# Patient Record
Sex: FEMALE | Race: WHITE | NOT HISPANIC OR LATINO | Employment: OTHER | URBAN - METROPOLITAN AREA
[De-identification: names, ages, dates, MRNs, and addresses within clinical notes are randomized per-mention and may not be internally consistent; named-entity substitution may affect disease eponyms.]

---

## 2017-01-19 ENCOUNTER — TRANSFERRED RECORDS (OUTPATIENT)
Dept: HEALTH INFORMATION MANAGEMENT | Facility: CLINIC | Age: 82
End: 2017-01-19

## 2018-09-25 ENCOUNTER — TRANSFERRED RECORDS (OUTPATIENT)
Dept: HEALTH INFORMATION MANAGEMENT | Facility: CLINIC | Age: 83
End: 2018-09-25

## 2018-09-25 LAB — TSH SERPL-ACNC: 10.73 MIU/L (ref 0.4–4.5)

## 2018-10-01 ENCOUNTER — HOSPITAL ENCOUNTER (OUTPATIENT)
Dept: ULTRASOUND IMAGING | Facility: CLINIC | Age: 83
Discharge: HOME | End: 2018-10-01
Attending: FAMILY MEDICINE | Admitting: FAMILY MEDICINE
Payer: COMMERCIAL

## 2018-10-01 ENCOUNTER — RADIANT APPOINTMENT (OUTPATIENT)
Dept: GENERAL RADIOLOGY | Facility: CLINIC | Age: 83
End: 2018-10-01
Attending: FAMILY MEDICINE
Payer: COMMERCIAL

## 2018-10-01 ENCOUNTER — OFFICE VISIT (OUTPATIENT)
Dept: FAMILY MEDICINE | Facility: CLINIC | Age: 83
End: 2018-10-01
Payer: COMMERCIAL

## 2018-10-01 VITALS
SYSTOLIC BLOOD PRESSURE: 166 MMHG | OXYGEN SATURATION: 96 % | DIASTOLIC BLOOD PRESSURE: 102 MMHG | TEMPERATURE: 97 F | HEART RATE: 68 BPM | WEIGHT: 197 LBS

## 2018-10-01 DIAGNOSIS — M25.562 ACUTE PAIN OF LEFT KNEE: ICD-10-CM

## 2018-10-01 DIAGNOSIS — M79.89 LEFT LEG SWELLING: ICD-10-CM

## 2018-10-01 DIAGNOSIS — Z28.20 VACCINE REFUSED BY PATIENT: ICD-10-CM

## 2018-10-01 DIAGNOSIS — M25.462 KNEE EFFUSION, LEFT: ICD-10-CM

## 2018-10-01 DIAGNOSIS — I10 UNCONTROLLED HYPERTENSION: Primary | ICD-10-CM

## 2018-10-01 PROBLEM — Z91.199 PROBLEM WITH MEDICAL CARE COMPLIANCE: Status: ACTIVE | Noted: 2018-10-01

## 2018-10-01 LAB
ALBUMIN UR-MCNC: NEGATIVE MG/DL
APPEARANCE UR: CLEAR
BILIRUB UR QL STRIP: NEGATIVE
COLOR UR AUTO: YELLOW
GLUCOSE UR STRIP-MCNC: NEGATIVE MG/DL
HGB UR QL STRIP: ABNORMAL
KETONES UR STRIP-MCNC: NEGATIVE MG/DL
LEUKOCYTE ESTERASE UR QL STRIP: NEGATIVE
NITRATE UR QL: NEGATIVE
NON-SQ EPI CELLS #/AREA URNS LPF: ABNORMAL /LPF
PH UR STRIP: 6 PH (ref 5–7)
RBC #/AREA URNS AUTO: ABNORMAL /HPF
SOURCE: ABNORMAL
SP GR UR STRIP: <=1.005 (ref 1–1.03)
UROBILINOGEN UR STRIP-ACNC: 0.2 EU/DL (ref 0.2–1)
WBC #/AREA URNS AUTO: ABNORMAL /HPF

## 2018-10-01 PROCEDURE — 73560 X-RAY EXAM OF KNEE 1 OR 2: CPT | Mod: LT

## 2018-10-01 PROCEDURE — 71046 X-RAY EXAM CHEST 2 VIEWS: CPT | Mod: FY

## 2018-10-01 PROCEDURE — 93000 ELECTROCARDIOGRAM COMPLETE: CPT | Performed by: FAMILY MEDICINE

## 2018-10-01 PROCEDURE — 93971 EXTREMITY STUDY: CPT | Mod: LT

## 2018-10-01 PROCEDURE — 99204 OFFICE O/P NEW MOD 45 MIN: CPT | Performed by: FAMILY MEDICINE

## 2018-10-01 PROCEDURE — 81001 URINALYSIS AUTO W/SCOPE: CPT | Performed by: FAMILY MEDICINE

## 2018-10-01 RX ORDER — ACETAMINOPHEN 325 MG/1
325-650 TABLET ORAL EVERY 6 HOURS PRN
Qty: 100 TABLET | Refills: 0
Start: 2018-10-01 | End: 2021-10-07

## 2018-10-01 RX ORDER — LISINOPRIL 5 MG/1
5 TABLET ORAL DAILY
Qty: 30 TABLET | Refills: 0 | Status: SHIPPED | OUTPATIENT
Start: 2018-10-01 | End: 2018-10-24

## 2018-10-01 NOTE — NURSING NOTE
Initial BP (!) 166/102 (BP Location: Right arm, Patient Position: Chair, Cuff Size: Adult Large)  Pulse 68  Temp 97  F (36.1  C) (Tympanic)  Wt 197 lb (89.4 kg)  SpO2 96% There is no height or weight on file to calculate BMI. .    Diana Ojeda

## 2018-10-01 NOTE — PATIENT INSTRUCTIONS
You will be contacted in 1-2 business days to get a schedule for the orthopedic specialist for the left knee.  Acetaminophen 325 mg orally 1-2 tablets every 4-6 hrs as needed for pain    Your blood pressure today is uncontrolled.  Due to this medical treatment should be started to prevent complications.  Start lisinopril 5 mg orally once a day.  Blood, urine, chest xray and EKG tests are ordered as baseline.  Low salt, low fat diet.   Exercise as tolerated 30 mins per day 3 days a week.    Take meds as prescribed; call if with side effects.   Follow up in clinic in 1 week with the nurse.    Low-Salt Diet  This diet removes foods that are high in salt. It also limits the amount of salt you use when cooking. It is most often used for people with high blood pressure, edema (fluid retention), and kidney, liver, or heart disease.  Table salt contains the mineral sodium. Your body needs sodium to work normally. But too much sodium can make your health problems worse. Your healthcare provider is recommending a low-salt (also called low-sodium) diet for you. Your total daily allowance of salt is 1,500 to 2,300 milligrams (mg). It is less than 1 teaspoon of table salt. This means you can have only about 500 to 700 mg of sodium at each meal. People with certain health problems should limit salt intake to the lower end of the recommended range.    When you cook, don t add much salt. If you can cook without using salt, even better. Don t add salt to your food at the table.  When shopping, read food labels. Salt is often called sodium on the label. Choose foods that are salt-free, low salt, or very low salt. Note that foods with reduced salt may not lower your salt intake enough.    Beans, potatoes, and pasta  Ok: Dry beans, split peas, lentils, potatoes, rice, macaroni, pasta, spaghetti without added salt  Avoid: Potato chips, tortilla chips, and similar products  Breads and cereals  Ok: Low-sodium breads, rolls, cereals, and  cakes; low-salt crackers, matzo crackers  Avoid: Salted crackers, pretzels, popcorn, Bulgarian toast, pancakes, muffins  Dairy  Ok: Milk, chocolate milk, hot chocolate mix, low-salt cheeses, and yogurt  Avoid: Processed cheese and cheese spreads; Roquefort, Camembert, and cottage cheese; buttermilk, instant breakfast drink  Desserts  Ok: Ice cream, frozen yogurt, juice bars, gelatin, cookies and pies, sugar, honey, jelly, hard candy  Avoid: Most pies, cakes and cookies prepared or processed with salt; instant pudding  Drinks  Ok: Tea, coffee, fizzy (carbonated) drinks, juices  Avoid: Flavored coffees, electrolyte replacement drinks, sports drinks  Meats  Ok: All fresh meat, fish, poultry, low-salt tuna, eggs, egg substitute  Avoid: Smoked, pickled, brine-cured, or salted meats and fish. This includes wright, chipped beef, corned beef, hot dogs, deli meats, ham, kosher meats, salt pork, sausage, canned tuna, salted codfish, smoked salmon, herring, sardines, or anchovies.  Seasonings and spices  Ok: Most seasonings are okay. Good substitutes for salt include: fresh herb blends, hot sauce, lemon, garlic, mtz, vinegar, dry mustard, parsley, cilantro, horseradish, tomato paste, regular margarine, mayonnaise, unsalted butter, cream cheese, vegetable oil, cream, low-salt salad dressing and gravy.  Avoid: Regular ketchup, relishes, pickles, soy sauce, teriyaki sauce, Worcestershire sauce, BBQ sauce, tartar sauce, meat tenderizer, chili sauce, regular gravy, regular salad dressing, salted butter  Soups  Ok: Low-salt soups and broths made with allowed foods  Avoid: Bouillon cubes, soups with smoked or salted meats, regular soup and broth  Vegetables  Ok: Most vegetables are okay; also low-salt tomato and vegetable juices  Avoid: Sauerkraut and other brine-soaked vegetables; pickles and other pickled vegetables; tomato juice, olives  Date Last Reviewed: 8/1/2016 2000-2017 [employer]. [address] [facility],  [place] [address]. All rights reserved. This information is not intended as a substitute for professional medical care. Always follow your healthcare professional's instructions.

## 2018-10-01 NOTE — PROGRESS NOTES
SUBJECTIVE:   Sena Saini is a 83 year old female who presents to clinic today for the following health issues:      New Patient/Transfer of Care- has had very little medical care in [place]    Hx of HTN has not taken medication for 2 years and quit taking. States her BP's are in the 170/101, 150/91, up to 196/110.  Patient has not been on medications due to using non-medical homeopathic treatments/supplements and infrared treatments.  Had not been better in spite of the non-medical treatments.  Denies chest pain, dyspnea, HA, BOV, dizziness or urinary changes.  Patient said she has not been observing sodium restriction.    C/O left leg swelling and pain for 6 days, concerned about having a blood clot.  Patient denies new activities.  Patient has had a road trip to [facility] about 3 weeks ago - said they stopped about three times for rest stops/voiding breaks during one way trips.    Patient refuses vaccines.      Problem list and histories reviewed & adjusted, as indicated.  Additional history: as documented    Patient Active Problem List   Diagnosis     Vaccine refused by patient     Problem with medical care compliance     Uncontrolled hypertension     Past Surgical History:   Procedure Laterality Date     APPENDECTOMY       COLONOSCOPY         Social History   Substance Use Topics     Smoking status: Former Smoker     Types: Cigarettes     Smokeless tobacco: Not on file     Alcohol use Not on file     Family History   Problem Relation Age of Onset     Hypertension Mother      Colon Cancer Brother      Other Cancer Brother          Current Outpatient Prescriptions   Medication Sig Dispense Refill     acetaminophen (TYLENOL) 325 MG tablet Take 1-2 tablets (325-650 mg) by mouth every 6 hours as needed for mild pain 100 tablet 0     lisinopril (PRINIVIL/ZESTRIL) 5 MG tablet Take 1 tablet (5 mg) by mouth daily 30 tablet 0     Allergies   Allergen Reactions     Penicillins        Reviewed and updated as needed  this visit by clinical staff  Allergies  Meds  Problems       Reviewed and updated as needed this visit by Provider  Allergies  Meds  Problems         ROS:  C: NEGATIVE for fever, chills, or change in weight  I: NEGATIVE for worrisome rashes, moles or lesions  E: NEGATIVE for vision changes or irritation  ENT: NEGATIVE for ear, mouth and throat problems  R: NEGATIVE for significant cough or SOB  CV: NEGATIVE for chest pain, palpitations or peripheral edema  GI: NEGATIVE for nausea, abdominal pain, heartburn, or change in bowel habits   male: negative for dysuria, hematuria, decreased urinary stream, erectile dysfunction, urethral discharge  M: see above  N: NEGATIVE for weakness, dizziness or paresthesias  E: NEGATIVE for temperature intolerance, skin/hair changes  H: NEGATIVE for bleeding problems  P: NEGATIVE for changes in mood or affect    OBJECTIVE:                                                    BP (!) 166/102 (BP Location: Right arm, Patient Position: Chair, Cuff Size: Adult Large)  Pulse 68  Temp 97  F (36.1  C) (Tympanic)  Wt 197 lb (89.4 kg)  SpO2 96%  There is no height or weight on file to calculate BMI.  GENERAL: healthy, alert and no distress, ambulatory w/o assist  NECK: no tenderness, no adenopathy,  Thyroid not enlarged  RESP: lungs clear to auscultation - no rales, no rhonchi, no wheezes  CV: regular rates and rhythm, no murmur  MS: right leg no edema or TTP; LLE with mild slightly pitting edema to the knee with mild popliteal and proximal calf TTP but no erythema or increased warmth; bilateral knees with full range of motion and mild crepitation with no pain on range of motion   SKIN: no suspicious lesions, no rashes  NEURO: no tremors  ABD:  nontender    Diagnostic test results:  Diagnostic Test Results:  none      ASSESSMENT/PLAN:                                                        ICD-10-CM    1. Uncontrolled hypertension I10 lisinopril (PRINIVIL/ZESTRIL) 5 MG tablet     EKG  12-lead complete w/read - Clinics     *UA reflex to Microscopic     XR Chest 2 Views     Urine Microscopic  Patient was advised BP uncontrolled today.  Reviewed meds with patient.  Start the above med to optimize management.  Reinforced sodium restriction.  Exercise recommendations reviewed with patient.  Recheck with RN in 1 week      2. Left leg swelling M79.89 US Lower Extremity Venous Duplex Left  With recent long distance driving, should r/o DVT.  Patient returned to clinic after US - advised no DVT on US.  No other red flag signs today.  Consider venous insufficiency, dependent edema or part of DJD?     3. Acute pain of left knee M25.562 US Lower Extremity Venous Duplex Left     XR Knee Standing AP Bilat & Lateral Left     ORTHO  REFERRAL     acetaminophen (TYLENOL) 325 MG tablet     DDx: DJD, inflammatory arthritis, crystalloid arthritis, strain, bakers cyst, DVT  DVT ruled out by US today.  Reviewed with patient xr images - some DJD present.  Consult ortho as US also showed nn-specific effusion. Consider tapping the effusion.  Acetaminophen 325 mg orally 1-2 tabs every 4-6 hrs as needed for pain     4. Knee effusion, left M25.462 ORTHO  REFERRAL  Unclear etiology.  DJD?   See above.     5. Vaccine refused by patient Z28.20      Total time: 40 mins, more than 50 % spent in direct patient care and addressing all the above    Follow up with RN 1 week  Ortho consult when scheduled in next 1-2 weeks.     Patient Instructions   You will be contacted in 1-2 business days to get a schedule for the orthopedic specialist for the left knee.  Acetaminophen 325 mg orally 1-2 tablets every 4-6 hrs as needed for pain    Your blood pressure today is uncontrolled.  Due to this medical treatment should be started to prevent complications.  Start lisinopril 5 mg orally once a day.  Blood, urine, chest xray and EKG tests are ordered as baseline.  Low salt, low fat diet.   Exercise as tolerated 30 mins per day  3 days a week.    Take meds as prescribed; call if with side effects.   Follow up in clinic in 1 week with the nurse.    Low-Salt Diet  This diet removes foods that are high in salt. It also limits the amount of salt you use when cooking. It is most often used for people with high blood pressure, edema (fluid retention), and kidney, liver, or heart disease.  Table salt contains the mineral sodium. Your body needs sodium to work normally. But too much sodium can make your health problems worse. Your healthcare provider is recommending a low-salt (also called low-sodium) diet for you. Your total daily allowance of salt is 1,500 to 2,300 milligrams (mg). It is less than 1 teaspoon of table salt. This means you can have only about 500 to 700 mg of sodium at each meal. People with certain health problems should limit salt intake to the lower end of the recommended range.    When you cook, don t add much salt. If you can cook without using salt, even better. Don t add salt to your food at the table.  When shopping, read food labels. Salt is often called sodium on the label. Choose foods that are salt-free, low salt, or very low salt. Note that foods with reduced salt may not lower your salt intake enough.    Beans, potatoes, and pasta  Ok: Dry beans, split peas, lentils, potatoes, rice, macaroni, pasta, spaghetti without added salt  Avoid: Potato chips, tortilla chips, and similar products  Breads and cereals  Ok: Low-sodium breads, rolls, cereals, and cakes; low-salt crackers, matzo crackers  Avoid: Salted crackers, pretzels, popcorn, Chinese toast, pancakes, muffins  Dairy  Ok: Milk, chocolate milk, hot chocolate mix, low-salt cheeses, and yogurt  Avoid: Processed cheese and cheese spreads; Roquefort, Camembert, and cottage cheese; buttermilk, instant breakfast drink  Desserts  Ok: Ice cream, frozen yogurt, juice bars, gelatin, cookies and pies, sugar, honey, jelly, hard candy  Avoid: Most pies, cakes and cookies  prepared or processed with salt; instant pudding  Drinks  Ok: Tea, coffee, fizzy (carbonated) drinks, juices  Avoid: Flavored coffees, electrolyte replacement drinks, sports drinks  Meats  Ok: All fresh meat, fish, poultry, low-salt tuna, eggs, egg substitute  Avoid: Smoked, pickled, brine-cured, or salted meats and fish. This includes wright, chipped beef, corned beef, hot dogs, deli meats, ham, kosher meats, salt pork, sausage, canned tuna, salted codfish, smoked salmon, herring, sardines, or anchovies.  Seasonings and spices  Ok: Most seasonings are okay. Good substitutes for salt include: fresh herb blends, hot sauce, lemon, garlic, mtz, vinegar, dry mustard, parsley, cilantro, horseradish, tomato paste, regular margarine, mayonnaise, unsalted butter, cream cheese, vegetable oil, cream, low-salt salad dressing and gravy.  Avoid: Regular ketchup, relishes, pickles, soy sauce, teriyaki sauce, Worcestershire sauce, BBQ sauce, tartar sauce, meat tenderizer, chili sauce, regular gravy, regular salad dressing, salted butter  Soups  Ok: Low-salt soups and broths made with allowed foods  Avoid: Bouillon cubes, soups with smoked or salted meats, regular soup and broth  Vegetables  Ok: Most vegetables are okay; also low-salt tomato and vegetable juices  Avoid: Sauerkraut and other brine-soaked vegetables; pickles and other pickled vegetables; tomato juice, olives  Date Last Reviewed: 8/1/2016 2000-2017 [email or web address]. [address] [place] [address], [place] [address]. All rights reserved. This information is not intended as a substitute for professional medical care. Always follow your healthcare professional's instructions.            Neno Genao MD  [facility]

## 2018-10-01 NOTE — PROGRESS NOTES
Patient was advised before leaving clinic today.  P-waves identified. No ST changes consistent with acute ischemia.  Treat hypertension.

## 2018-10-01 NOTE — MR AVS SNAPSHOT
After Visit Summary   10/1/2018    Sena Saini    MRN: [id number]           Patient Information     Date Of Birth          1935        Visit Information        Provider Department      10/1/2018 7:40 AM Neno Genao MD [facility]        Today's Diagnoses     Uncontrolled hypertension    -  1    Left leg swelling        Acute pain of left knee        Knee effusion, left        Vaccine refused by patient          Care Instructions    You will be contacted in 1-2 business days to get a schedule for the orthopedic specialist for the left knee.  Acetaminophen 325 mg orally 1-2 tablets every 4-6 hrs as needed for pain    Your blood pressure today is uncontrolled.  Due to this medical treatment should be started to prevent complications.  Start lisinopril 5 mg orally once a day.  Blood, urine, chest xray and EKG tests are ordered as baseline.  Low salt, low fat diet.   Exercise as tolerated 30 mins per day 3 days a week.    Take meds as prescribed; call if with side effects.   Follow up in clinic in 1 week with the nurse.    Low-Salt Diet  This diet removes foods that are high in salt. It also limits the amount of salt you use when cooking. It is most often used for people with high blood pressure, edema (fluid retention), and kidney, liver, or heart disease.  Table salt contains the mineral sodium. Your body needs sodium to work normally. But too much sodium can make your health problems worse. Your healthcare provider is recommending a low-salt (also called low-sodium) diet for you. Your total daily allowance of salt is 1,500 to 2,300 milligrams (mg). It is less than 1 teaspoon of table salt. This means you can have only about 500 to 700 mg of sodium at each meal. People with certain health problems should limit salt intake to the lower end of the recommended range.    When you cook, don t add much salt. If you can cook without using salt, even better. Don t add salt to  your food at the table.  When shopping, read food labels. Salt is often called sodium on the label. Choose foods that are salt-free, low salt, or very low salt. Note that foods with reduced salt may not lower your salt intake enough.    Beans, potatoes, and pasta  Ok: Dry beans, split peas, lentils, potatoes, rice, macaroni, pasta, spaghetti without added salt  Avoid: Potato chips, tortilla chips, and similar products  Breads and cereals  Ok: Low-sodium breads, rolls, cereals, and cakes; low-salt crackers, matzo crackers  Avoid: Salted crackers, pretzels, popcorn, [place] toast, pancakes, muffins  Dairy  Ok: Milk, chocolate milk, hot chocolate mix, low-salt cheeses, and yogurt  Avoid: Processed cheese and cheese spreads; Roquefort, Camembert, and cottage cheese; buttermilk, instant breakfast drink  Desserts  Ok: Ice cream, frozen yogurt, juice bars, gelatin, cookies and pies, sugar, honey, jelly, hard candy  Avoid: Most pies, cakes and cookies prepared or processed with salt; instant pudding  Drinks  Ok: Tea, coffee, fizzy (carbonated) drinks, juices  Avoid: Flavored coffees, electrolyte replacement drinks, sports drinks  Meats  Ok: All fresh meat, fish, poultry, low-salt tuna, eggs, egg substitute  Avoid: Smoked, pickled, brine-cured, or salted meats and fish. This includes wright, chipped beef, corned beef, hot dogs, deli meats, ham, kosher meats, salt pork, sausage, canned tuna, salted codfish, smoked salmon, herring, sardines, or anchovies.  Seasonings and spices  Ok: Most seasonings are okay. Good substitutes for salt include: fresh herb blends, hot sauce, lemon, garlic, mtz, vinegar, dry mustard, parsley, cilantro, horseradish, tomato paste, regular margarine, mayonnaise, unsalted butter, cream cheese, vegetable oil, cream, low-salt salad dressing and gravy.  Avoid: Regular ketchup, relishes, pickles, soy sauce, teriyaki sauce, Worcestershire sauce, BBQ sauce, tartar sauce, meat tenderizer, chili sauce,  regular gravy, regular salad dressing, salted butter  Soups  Ok: Low-salt soups and broths made with allowed foods  Avoid: Bouillon cubes, soups with smoked or salted meats, regular soup and broth  Vegetables  Ok: Most vegetables are okay; also low-salt tomato and vegetable juices  Avoid: Sauerkraut and other brine-soaked vegetables; pickles and other pickled vegetables; tomato juice, olives  Date Last Reviewed: 8/1/2016 2000-2017 [employer]. [address] [place] [address]. All rights reserved. This information is not intended as a substitute for professional medical care. Always follow your healthcare professional's instructions.                Follow-ups after your visit        Additional Services     ORTHO  REFERRAL       [facility] is referring you to the Orthopedic  Services at [facility].       The  Representative will assist you in the coordination of your Orthopedic and Musculoskeletal Care as prescribed by your physician.    The  Representative will call you within 1 business day to help schedule your appointment, or you may contact the  Representative at:    All areas ~ [phone]     Type of Referral : Non Surgical / Sport Medicine       Timeframe requested: 1 - 2 days    Coverage of these services is subject to the terms and limitations of your health insurance plan.  Please call member services at your health plan with any benefit or coverage questions.      If X-rays, CT or MRI's have been performed, please contact the facility where they were done to arrange for , prior to your scheduled appointment.  Please bring this referral request to your appointment and present it to your specialist.                  Follow-up notes from your care team     Return in about 1 week (around 10/8/2018), or if symptoms worsen or fail to improve, for BP Recheck with RN.      Your next 10  "appointments already scheduled     Oct 08, 2018  8:45 AM CDT   SHORT with [place] FP/IM RN   [facility]    [address] [address]   [phone]            Oct 08, 2018  9:30 AM CDT   SHORT with [place] FP/IM RN   [facility]    [address] [address]   [phone]              Who to contact     If you have questions or need follow up information about today's clinic visit or your schedule please contact [facility] directly at [phone].  Normal or non-critical lab and imaging results will be communicated to you by HowDohart, letter or phone within 4 business days after the clinic has received the results. If you do not hear from us within 7 days, please contact the clinic through HowDohart or phone. If you have a critical or abnormal lab result, we will notify you by phone as soon as possible.  Submit refill requests through MeriTaleem or call your pharmacy and they will forward the refill request to us. Please allow 3 business days for your refill to be completed.          Additional Information About Your Visit        HowDohart Information     MeriT[employer]em lets you send messages to your doctor, view your test results, renew your prescriptions, schedule appointments and more. To sign up, go to [email or web address] . Click on \"Log in\" on the left side of the screen, which will take you to the Welcome page. Then click on \"Sign up Now\" on the right side of the page.     You will be asked to enter the access code listed below, as well as some personal information. Please follow the directions to create your username and password.     Your access code is: [email or web address]  Expires: 2018  7:34 AM     Your access code will  in 90 days. If you need help or a new code, please call your [place] clinic or [phone].        Care EveryWhere ID     This is your Care EveryWhere ID. This " could be used by other organizations to access your [place] medical records  [id number]        Your Vitals Were     Pulse Temperature Pulse Oximetry             68 97  F (36.1  C) (Tympanic) 96%          Blood Pressure from Last 3 Encounters:   10/01/18 (!) 166/102    Weight from Last 3 Encounters:   10/01/18 197 lb (89.4 kg)              We Performed the Following     *UA reflex to Microscopic     EKG 12-lead complete w/read - Clinics     ORTHO  REFERRAL     US Lower Extremity Venous Duplex Left     XR Chest 2 Views     XR Knee Standing AP Bilat & Lateral Left          Today's Medication Changes          These changes are accurate as of 10/1/18 10:34 AM.  If you have any questions, ask your nurse or doctor.               Start taking these medicines.        Dose/Directions    acetaminophen 325 MG tablet   Commonly known as:  TYLENOL   Used for:  Acute pain of left knee   Started by:  Neno Genao MD        Dose:  325-650 mg   Take 1-2 tablets (325-650 mg) by mouth every 6 hours as needed for mild pain   Quantity:  100 tablet   Refills:  0       lisinopril 5 MG tablet   Commonly known as:  PRINIVIL/ZESTRIL   Used for:  Uncontrolled hypertension   Started by:  Neno Genao MD        Dose:  5 mg   Take 1 tablet (5 mg) by mouth daily   Quantity:  30 tablet   Refills:  0            Where to get your medicines      These medications were [id number] [place] - [address]  [address] [place] [address]     Phone:  [phone]     lisinopril 5 MG tablet         Some of these will need a paper prescription and others can be bought over the counter.  Ask your nurse if you have questions.     You don't need a prescription for these medications     acetaminophen 325 MG tablet                Primary Care Provider Office Phone # Fax #    Neno Genao [phone] [address] [address]         Equal Access to Services     [facility] SANDI : Hadii aad ku haddenaejeremy Owens, wareynoldda luqadaha, qaybta taraevelynviky robin. So [facility] [phone].    ATENCIÓN: Si habla español, tiene a alatorre disposición servicios gratuitos de asistencia lingüística. Llame al [phone].    We comply with applicable federal civil rights laws and [place] laws. We do not discriminate on the basis of race, color, national origin, age, disability, sex, sexual orientation, or gender identity.            Thank you!     Thank you for choosing [facility]  for your care. Our goal is always to provide you with excellent care. Hearing back from our patients is one way we can continue to improve our services. Please take a few minutes to complete the written survey that you may receive in the mail after your visit with us. Thank you!             Your Updated Medication List - Protect others around you: Learn how to safely use, store and throw away your medicines at www.disposemymeds.org.          This list is accurate as of 10/1/18 10:34 AM.  Always use your most recent med list.                   Brand Name Dispense Instructions for use Diagnosis    acetaminophen 325 MG tablet    TYLENOL    100 tablet    Take 1-2 tablets (325-650 mg) by mouth every 6 hours as needed for mild pain    Acute pain of left knee       lisinopril 5 MG tablet    PRINIVIL/ZESTRIL    30 tablet    Take 1 tablet (5 mg) by mouth daily    Uncontrolled hypertension

## 2018-10-03 ENCOUNTER — OFFICE VISIT (OUTPATIENT)
Dept: ORTHOPEDICS | Facility: CLINIC | Age: 83
End: 2018-10-03
Payer: COMMERCIAL

## 2018-10-03 VITALS
DIASTOLIC BLOOD PRESSURE: 92 MMHG | HEIGHT: 69 IN | RESPIRATION RATE: 16 BRPM | SYSTOLIC BLOOD PRESSURE: 154 MMHG | BODY MASS INDEX: 29.18 KG/M2 | WEIGHT: 197 LBS

## 2018-10-03 DIAGNOSIS — M65.332 TRIGGER MIDDLE FINGER OF LEFT HAND: ICD-10-CM

## 2018-10-03 DIAGNOSIS — M17.12 PRIMARY OSTEOARTHRITIS OF LEFT KNEE: Primary | ICD-10-CM

## 2018-10-03 PROCEDURE — 99203 OFFICE O/P NEW LOW 30 MIN: CPT | Performed by: PEDIATRICS

## 2018-10-03 ASSESSMENT — PAIN SCALES - GENERAL: PAINLEVEL: MILD PAIN (2)

## 2018-10-03 NOTE — MR AVS SNAPSHOT
"              After Visit Summary   10/3/2018    Sena Saini    MRN: [id number]           Patient Information     Date Of Birth          1935        Visit Information        Provider Department      10/3/2018 10:40 AM Aayush Figueroa,  [facility]        Care Instructions    Patient to follow up with Primary Care provider regarding elevated blood pressure.              Follow-ups after your visit        Your next 10 appointments already scheduled     Oct 08, 2018  8:45 AM CDT   SHORT with [place] FP/IM RN   [facility]    [address] [address]   [phone]            Oct 08, 2018  9:30 AM CDT   SHORT with [place] FP/IM RN   [facility]    [address] [address]   [phone]              Who to contact     If you have questions or need follow up information about today's clinic visit or your schedule please contact [facility] directly at [phone].  Normal or non-critical lab and imaging results will be communicated to you by EdSurgehart, letter or phone within 4 business days after the clinic has received the results. If you do not hear from us within 7 days, please contact the clinic through EdSurgehart or phone. If you have a critical or abnormal lab result, we will notify you by phone as soon as possible.  Submit refill requests through [email or web address] or call your pharmacy and they will forward the refill request to us. Please allow 3 business days for your refill to be completed.          Additional Information About Your Visit        MyChart Information     [email or web address] lets you send messages to your doctor, view your test results, renew your prescriptions, schedule appointments and more. To sign up, go to [email or web address] . Click on \"Log in\" on the left side of the screen, which will take you to the Welcome page. " "Then click on \"Sign up Now\" on the right side of the page.     You will be asked to enter the access code listed below, as well as some personal information. Please follow the directions to create your username and password.     Your access code is: [email or web address]  Expires: 2018  7:34 AM     Your access code will  in 90 days. If you need help or a new code, please call your [place] clinic or [phone].        Care EveryWhere ID     This is your Care EveryWhere ID. This could be used by other organizations to access your [place] medical records  [id number]        Your Vitals Were     Respirations Height BMI (Body Mass Index)             16 5' 9\" (1.753 m) 29.09 kg/m2          Blood Pressure from Last 3 Encounters:   10/03/18 (!) 154/92   10/01/18 (!) 166/102    Weight from Last 3 Encounters:   10/03/18 197 lb (89.4 kg)   10/01/18 197 lb (89.4 kg)              Today, you had the following     No orders found for display       Primary Care Provider Office Phone # Fax #    Neno Edwin Genao [phone] [address] [address]        Equal Access to Services     RACHEL JUNIOR : Hadii aad ku hadasho Soomaali, waaxda luqadaha, qaybta kaalmada adeegyada, waxay amber haykulwantn iker forde . So [facility] [phone].    ATENCIÓN: Si habla español, tiene a alatorre disposición servicios gratuitos de asistencia lingüística. Llame al [phone].    We comply with applicable federal civil rights laws and [place] laws. We do not discriminate on the basis of race, color, national origin, age, disability, sex, sexual orientation, or gender identity.            Thank you!     Thank you for choosing [facility]  for your care. Our goal is always to provide you with excellent care. Hearing back from our patients is one way we can continue to improve our services. Please take a few minutes to complete the written survey that you may " receive in the mail after your visit with us. Thank you!             Your Updated Medication List - Protect others around you: Learn how to safely use, store and throw away your medicines at www.disposemymeds.org.          This list is accurate as of 10/3/18 11:39 AM.  Always use your most recent med list.                   Brand Name Dispense Instructions for use Diagnosis    acetaminophen 325 MG tablet    TYLENOL    100 tablet    Take 1-2 tablets (325-650 mg) by mouth every 6 hours as needed for mild pain    Acute pain of left knee       lisinopril 5 MG tablet    PRINIVIL/ZESTRIL    30 tablet    Take 1 tablet (5 mg) by mouth daily    Uncontrolled hypertension

## 2018-10-03 NOTE — NURSING NOTE
"Initial BP (!) 154/92  Resp 16  Ht 5' 9\" (1.753 m)  Wt 197 lb (89.4 kg)  BMI 29.09 kg/m2 Estimated body mass index is 29.09 kg/(m^2) as calculated from the following:    Height as of this encounter: 5' 9\" (1.753 m).    Weight as of this encounter: 197 lb (89.4 kg). .      "

## 2018-10-03 NOTE — LETTER
10/3/2018         RE: Sena Saini  [address]  [place] [address]        Dear Colleague,    Thank you for referring your patient, Sena Saini, to the [facility]. Please see a copy of my visit note below.    Sports Medicine Clinic Visit    PCP: Neno Genao    Sena Saini is a 83 year old female who is seen  in consultation at the request of  Neno Genao M.D. presenting with left knee pain.    Injury: None. Noticed sharp pain in left knee after getting out of car during a trip.    **  Lateroposterior knee pain. Swelling noted on LLE. Reports difficulty getting out of the car, pain began after trying to get out of car. Reports using walker after knee injury. Has been using compression with some symptom alleviation.     Also with left hand discomfort, notes issues at her left long finger.  Trigger finger of long finger. States finger locks into placed while playing instrument. Reports worsening of symptoms at night. Aggravated while cooking and playing instrument.    Presents with     Location of Pain: left posterior knee  Duration of Pain: Ongoing, but worse 1 week ago.  Rating of Pain at worst: 10/10  Rating of Pain Currently: 2/10 While sitting   Symptoms are better with: Knee brace  Symptoms are worse with: Walking, moving to sit/stand, 2 seconds after first standing.   Additional Features:   Positive: Swelling of left leg, left posterior knee swelling,     Other evaluation and/or treatments so far consists of: Xray, US (DVT Neg)  Prior History of related problems: Arthritis    Social History: Retired.    Review of Systems  Musculoskeletal: as above  Remainder of review of systems is negative including constitutional, CV, pulmonary, GI, Skin and Neurologic except as noted in HPI or medical history.    Past Medical History:   Diagnosis Date     Arthritis      Hypertension      Past Surgical History:   Procedure Laterality Date      "APPENDECTOMY       COLONOSCOPY       Family History   Problem Relation Age of Onset     Hypertension Mother      Colon Cancer Brother      Other Cancer Brother      Social History     Social History     Marital status:      Spouse name: N/A     Number of children: N/A     Years of education: N/A     Occupational History     Not on file.     Social History Main Topics     Smoking status: Former Smoker     Types: Cigarettes     Smokeless tobacco: Never Used     Alcohol use Not on file     Drug use: Not on file     Sexual activity: Not on file     Other Topics Concern     Not on file     Social History Narrative     This document serves as a record of the services and decisions personally performed and made by DO WILLIE Garzon. It was created on their behalf by Cornelius Lala, a trained medical scribe. The creation of this document is based the provider's statements to the medical scribe.  Cornelius Lala October 3, 2018 11:00 AM    Objective  BP (!) 154/92  Resp 16  Ht 5' 9\" (1.753 m)  Wt 197 lb (89.4 kg)  BMI 29.09 kg/m2    GENERAL APPEARANCE: healthy, alert and no distress   GAIT: antalgic  SKIN: no suspicious lesions or rashes  NEURO: Normal strength and tone, mentation intact and speech normal  PSYCH:  mentation appears normal and affect normal/bright  HEENT: no scleral icterus  CV: no extremity edema  RESP: nonlabored breathing      Left Knee exam    ROM:        Flexion 110 degrees       Extension 10 degrees    Inspection:       no visible ecchymosis        effusion noted     Skin:       no visible deformities       well perfused       capillary refill brisk    Patellar Motion:        Crepitus noted in the patellofemoral joint    Non Tender:   To palpation    Hand/wrist (left):    Inspection:  No deformity noted.  No swelling. No ecchymosis.    Motion:  Reduced extension of long finger actively, stiffness    Sensation:  Grossly intact.    Radial pulses normal, +2/4, capillary refill brisk.    No " active triggering with motion.  Mild tenderness palmar base long finger      Radiology  Visualized radiographs of left knee taken on 10/01/2018, and reviewed the images with the patient and .  Impression: Bilateral knee OA.     XR KNEE STANDING AP BILAT AND LATERAL LEFT 10/1/2018 8:33 AM     COMPARISON: None.     HISTORY: LEFT knee pain.     FINDINGS:  RIGHT knee: Evaluation is limited to the anterior projection. Moderate  to severe osteoarthritis in the medial and lateral compartments with  chondrocalcinosis also noted. No fractures are seen.     LEFT knee: Tricompartmental osteophytosis, most pronounced in the  medial and lateral compartments. Chondrocalcinosis noted predominantly  in the medial compartment. No fractures are seen. Moderate knee  effusion.         IMPRESSION: Moderate to severe degenerative changes in both knees with  chondrocalcinosis noted predominantly in the medial compartments.  Moderate LEFT knee effusion.     MAXINE GALINDO MD.    ===============================================================  Reviewed past pertinent US  ULTRASOUND LEFT LOWER EXTREMITY VENOUS DUPLEX October 1, 2018 9:19 AM     HISTORY: Rule out deep vein thrombosis. Left leg swelling. Acute pain  of left knee.     TECHNIQUE: Venous Doppler US.?Color flow and spectral Doppler with  waveform analysis performed.         IMPRESSION:   1. No evidence of lower extremity deep venous thrombosis.   2. There is fluid seen at the anterior and lateral aspect of the knee.  This may well represent joint effusion and clinical correlation is  recommended.     JERO BONNER MD      Assessment:  1. Primary osteoarthritis of left knee    2. Trigger middle finger of left hand        Plan:  Discussed the assessment with the patient and .  Radiologic images reviewed and discussed with patient and  today  We discussed the following: symptom treatment, activity modification/rest, rehab, injection therapy, medication,  prolotherapy, chelation, chi machine, ginger baths and support for the affected area.    Her  had questions about prolotherapy, chelation.    Discussed nature of degenerative arthrosis of the knee. Discussed symptom treatment with over-the-counter medications, ice or heat, topical treatments, and rest if needed. Discussed use of compression or bracing for comfort. Discussed potential benefits of rehabilitation, to maintain or improve function at the knee. Discussed benefits of exercise and weight loss (if applicable) to reduce pressure at the knee. Discussed injection therapy. Also briefly discussed future consideration of referral to orthopedic surgery for further evaluation and discussion of additional treatment options.      Patient to follow up with Primary Care provider regarding elevated blood pressure.    Knee  Following discussion, plan:  Topical Treatments: Ice or Heat as needed.  Icing preferred if swelling.  Over the counter medication: Patient's preferred OTC medication as needed.  Activity Modification: as discussed  Compression options are ok  May continue to use chi machine as tolerated; it does not sound like this would cause too significant of motion at her knee.  Home exercise program. Given.  Offered physical therapy, declined.  Future consideration of steroid injection of the left knee.  Offered today, she declined.      Left long Finger Trigger Finger  Following discussion, plan:  Discussed contributing factors such as repetitive motion, use of tools that can cause pressure in this area.  Gave patient splint for nighttime splinting  Future consideration of steroid injection.  For now, she does not desire.  Also briefly discussed additional considerations of therapy, referral to orthopedic surgery.  She does not desire either of these options.      Follow up: as needed   Questions answered. The patient indicates understanding of these issues and agrees with the plan.    Aayush Figueroa,  WILLIE LARIOS    CC: Neno Genao          Disclaimer: This note consists of symbols derived from keyboarding, dictation and/or voice recognition software. As a result, there may be errors in the script that have gone undetected. Please consider this when interpreting information found in this chart.    The information in this document, created by the medical scribe for me, accurately reflects the services I personally performed and the decisions made by me. I have reviewed and approved this document for accuracy prior to leaving the patient care area.      Again, thank you for allowing me to participate in the care of your patient.        Sincerely,        Aayush Figueroa DO

## 2018-10-03 NOTE — PROGRESS NOTES
Sports Medicine Clinic Visit    PCP: Neno Genao    Sena Saini is a 83 year old female who is seen  in consultation at the request of  Neno Genao M.D. presenting with left knee pain.    Injury: None. Noticed sharp pain in left knee after getting out of car during a trip.    **  Lateroposterior knee pain. Swelling noted on LLE. Reports difficulty getting out of the car, pain began after trying to get out of car. Reports using walker after knee injury. Has been using compression with some symptom alleviation.     Also with left hand discomfort, notes issues at her left long finger.  Trigger finger of long finger. States finger locks into placed while playing instrument. Reports worsening of symptoms at night. Aggravated while cooking and playing instrument.    Presents with     Location of Pain: left posterior knee  Duration of Pain: Ongoing, but worse 1 week ago.  Rating of Pain at worst: 10/10  Rating of Pain Currently: 2/10 While sitting   Symptoms are better with: Knee brace  Symptoms are worse with: Walking, moving to sit/stand, 2 seconds after first standing.   Additional Features:   Positive: Swelling of left leg, left posterior knee swelling,     Other evaluation and/or treatments so far consists of: Xray, US (DVT Neg)  Prior History of related problems: Arthritis    Social History: Retired.    Review of Systems  Musculoskeletal: as above  Remainder of review of systems is negative including constitutional, CV, pulmonary, GI, Skin and Neurologic except as noted in HPI or medical history.    Past Medical History:   Diagnosis Date     Arthritis      Hypertension      Past Surgical History:   Procedure Laterality Date     APPENDECTOMY       COLONOSCOPY       Family History   Problem Relation Age of Onset     Hypertension Mother      Colon Cancer Brother      Other Cancer Brother      Social History     Social History     Marital status:      Spouse name: N/A     Number  "of children: N/A     Years of education: N/A     Occupational History     Not on file.     Social History Main Topics     Smoking status: Former Smoker     Types: Cigarettes     Smokeless tobacco: Never Used     Alcohol use Not on file     Drug use: Not on file     Sexual activity: Not on file     Other Topics Concern     Not on file     Social History Narrative     This document serves as a record of the services and decisions personally performed and made by DO WILLIE Garzon. It was created on their behalf by Cornelius Lala, a trained medical scribe. The creation of this document is based the provider's statements to the medical scribe.  Cornelius Lala October 3, 2018 11:00 AM    Objective  BP (!) 154/92  Resp 16  Ht 5' 9\" (1.753 m)  Wt 197 lb (89.4 kg)  BMI 29.09 kg/m2    GENERAL APPEARANCE: healthy, alert and no distress   GAIT: antalgic  SKIN: no suspicious lesions or rashes  NEURO: Normal strength and tone, mentation intact and speech normal  PSYCH:  mentation appears normal and affect normal/bright  HEENT: no scleral icterus  CV: no extremity edema  RESP: nonlabored breathing      Left Knee exam    ROM:        Flexion 110 degrees       Extension 10 degrees    Inspection:       no visible ecchymosis        effusion noted     Skin:       no visible deformities       well perfused       capillary refill brisk    Patellar Motion:        Crepitus noted in the patellofemoral joint    Non Tender:   To palpation    Hand/wrist (left):    Inspection:  No deformity noted.  No swelling. No ecchymosis.    Motion:  Reduced extension of long finger actively, stiffness    Sensation:  Grossly intact.    Radial pulses normal, +2/4, capillary refill brisk.    No active triggering with motion.  Mild tenderness palmar base long finger      Radiology  Visualized radiographs of left knee taken on 10/01/2018, and reviewed the images with the patient and .  Impression: Bilateral knee OA.     XR KNEE STANDING AP BILAT " AND LATERAL LEFT 10/1/2018 8:33 AM     COMPARISON: None.     HISTORY: LEFT knee pain.     FINDINGS:  RIGHT knee: Evaluation is limited to the anterior projection. Moderate  to severe osteoarthritis in the medial and lateral compartments with  chondrocalcinosis also noted. No fractures are seen.     LEFT knee: Tricompartmental osteophytosis, most pronounced in the  medial and lateral compartments. Chondrocalcinosis noted predominantly  in the medial compartment. No fractures are seen. Moderate knee  effusion.         IMPRESSION: Moderate to severe degenerative changes in both knees with  chondrocalcinosis noted predominantly in the medial compartments.  Moderate LEFT knee effusion.     MAXINE GALINDO MD.    ===============================================================  Reviewed past pertinent US  ULTRASOUND LEFT LOWER EXTREMITY VENOUS DUPLEX October 1, 2018 9:19 AM     HISTORY: Rule out deep vein thrombosis. Left leg swelling. Acute pain  of left knee.     TECHNIQUE: Venous Doppler US.?Color flow and spectral Doppler with  waveform analysis performed.         IMPRESSION:   1. No evidence of lower extremity deep venous thrombosis.   2. There is fluid seen at the anterior and lateral aspect of the knee.  This may well represent joint effusion and clinical correlation is  recommended.     JERO BONNER MD      Assessment:  1. Primary osteoarthritis of left knee    2. Trigger middle finger of left hand        Plan:  Discussed the assessment with the patient and .  Radiologic images reviewed and discussed with patient and  today  We discussed the following: symptom treatment, activity modification/rest, rehab, injection therapy, medication, prolotherapy, chelation, chi machine, jessica baths and support for the affected area.    Her  had questions about prolotherapy, chelation.    Discussed nature of degenerative arthrosis of the knee. Discussed symptom treatment with over-the-counter medications, ice  or heat, topical treatments, and rest if needed. Discussed use of compression or bracing for comfort. Discussed potential benefits of rehabilitation, to maintain or improve function at the knee. Discussed benefits of exercise and weight loss (if applicable) to reduce pressure at the knee. Discussed injection therapy. Also briefly discussed future consideration of referral to orthopedic surgery for further evaluation and discussion of additional treatment options.      Patient to follow up with Primary Care provider regarding elevated blood pressure.    Knee  Following discussion, plan:  Topical Treatments: Ice or Heat as needed.  Icing preferred if swelling.  Over the counter medication: Patient's preferred OTC medication as needed.  Activity Modification: as discussed  Compression options are ok  May continue to use chi machine as tolerated; it does not sound like this would cause too significant of motion at her knee.  Home exercise program. Given.  Offered physical therapy, declined.  Future consideration of steroid injection of the left knee.  Offered today, she declined.      Left long Finger Trigger Finger  Following discussion, plan:  Discussed contributing factors such as repetitive motion, use of tools that can cause pressure in this area.  Gave patient splint for nighttime splinting  Future consideration of steroid injection.  For now, she does not desire.  Also briefly discussed additional considerations of therapy, referral to orthopedic surgery.  She does not desire either of these options.      Follow up: as needed   Questions answered. The patient indicates understanding of these issues and agrees with the plan.    Aayush Figueroa DO, CAQ    CC: Neno Genao          Disclaimer: This note consists of symbols derived from keyboarding, dictation and/or voice recognition software. As a result, there may be errors in the script that have gone undetected. Please consider this when  interpreting information found in this chart.    The information in this document, created by the medical scribe for me, accurately reflects the services I personally performed and the decisions made by me. I have reviewed and approved this document for accuracy prior to leaving the patient care area.

## 2018-10-08 ENCOUNTER — TELEPHONE (OUTPATIENT)
Dept: FAMILY MEDICINE | Facility: CLINIC | Age: 83
End: 2018-10-08

## 2018-10-08 ENCOUNTER — ALLIED HEALTH/NURSE VISIT (OUTPATIENT)
Dept: FAMILY MEDICINE | Facility: CLINIC | Age: 83
End: 2018-10-08
Payer: COMMERCIAL

## 2018-10-08 VITALS — DIASTOLIC BLOOD PRESSURE: 86 MMHG | SYSTOLIC BLOOD PRESSURE: 146 MMHG | HEART RATE: 70 BPM

## 2018-10-08 DIAGNOSIS — I10 UNCONTROLLED HYPERTENSION: Primary | ICD-10-CM

## 2018-10-08 PROCEDURE — 99207 ZZC NO CHARGE NURSE ONLY: CPT

## 2018-10-08 NOTE — TELEPHONE ENCOUNTER
"Nursing Note   Ariadna Shah, RN (Registered Nurse)      Sena Saini is a 83 year old year old patient who comes in today for a Blood Pressure check because of ongoing blood pressure monitoring and recently started lisinopril at 10/1/18 office visit.  Pt is newly established with Dr Genao.     Vital Signs as repeated by RN:  166/88 pulse of 68  Recheck 5 min later is 146/86, pulse of 70.     Patient is taking medication as prescribed  Patient is tolerating medications well.  Patient is monitoring Blood Pressure at home.  Average readings are 140/88, pulse of 68.  Current complaints: none  Disposition:  Pt has MULTIPLE questions and concerns.  She is returning tomorrow to see Dr Genao and address her concerns.  Pt is leaving for the winter to [place] in 3 weeks so she is eager to have her health concerns addressed beforehand.     Initially, when pt arrived for today's RN visit, pt thought that she was seeing Dr Genao instead of the RN.  She was upset that she had been scheduled with the RN.    Pt shares that she sees a naturopathic provider as well.  She says that her naturopathic cardiologist told her that \"lisinopril is one of the worst medications a person can be taking.\"    Pt brought in two boxes, one with all her prior records for Dr Genao to review.  She retrieved three items from these records as the ones most relevent that she wishes for Dr Genao to see.  I copied them, put labels on them, and have placed them at Dr Genao's work station for tomorrow's appt.  The items are thyroid studies completed by [facility] on 9/25/18, a narrative from Dr Munoz,  and gerontologic medicine, dated 1/19/17 in which pt was advised to stop lisinopril/hctz, and an older thyroid panel dated 9/29/14.  The second box includes all her naturopathic medications and supplements that she takes.  I offered to record these into her medication record but pt declined preferring to go over them with Dr Genao at tomorrow's " appt.  Lastly, pt has disc with her from an abdominal CT that she had done at [facility] in June.  She intended to go to diagnostics and ask that it be copied for her record.     Routed to Dr Anay FERRELL.     Ariadna Shah RN         BP Readings from Last 6 Encounters:   10/08/18 146/86   10/03/18 (!) 154/92   10/01/18 (!) 166/102      No results found for: CR  No results found for: POTASSIUM

## 2018-10-08 NOTE — TELEPHONE ENCOUNTER
Dr Genao,    I called pt.  She has decided to stay on lisinopril for now.  If she develops a cough like she did before, she will call.  Pt intends to see Dr Genao again in 2 weeks before she leaves for [place].    Blood pressure today is 146/86, pulse of 70.  No further changes?    Ariadna Shah RN

## 2018-10-08 NOTE — TELEPHONE ENCOUNTER
Keep current dose of lisinopril for now. Will see what BP is on follow up. Cautious about bringing BP down too fast.

## 2018-10-08 NOTE — MR AVS SNAPSHOT
"              After Visit Summary   10/8/2018    Sena Saini    MRN: [id number]           Patient Information     Date Of Birth          1935        Visit Information        Provider Department      10/8/2018 8:45 AM [facility]        Today's Diagnoses     Uncontrolled hypertension    -  1       Follow-ups after your visit        Who to contact     If you have questions or need follow up information about today's clinic visit or your schedule please contact [facility] directly at [phone].  Normal or non-critical lab and imaging results will be communicated to you by MyChart, letter or phone within 4 business days after the clinic has received the results. If you do not hear from us within 7 days, please contact the clinic through [employer] or phone. If you have a critical or abnormal lab result, we will notify you by phone as soon as possible.  Submit refill requests through [employer] or call your pharmacy and they will forward the refill request to us. Please allow 3 business days for your refill to be completed.          Additional Information About Your Visit        MyChart Information     [employer] lets you send messages to your doctor, view your test results, renew your prescriptions, schedule appointments and more. To sign up, go to [email or web address] . Click on \"Log in\" on the left side of the screen, which will take you to the Welcome page. Then click on \"Sign up Now\" on the right side of the page.     You will be asked to enter the access code listed below, as well as some personal information. Please follow the directions to create your username and password.     Your access code is: [email or web address]  Expires: 2018  7:34 AM     Your access code will  in 90 days. If you need help or a new code, please call your [facility] or [phone].        Care EveryWhere ID     This is your Care EveryWhere ID. This could be used by other organizations " to access your [facility] medical records  [id number]        Your Vitals Were     Pulse                   70            Blood Pressure from Last 3 Encounters:   10/08/18 146/86   10/03/18 (!) 154/92   10/01/18 (!) 166/102    Weight from Last 3 Encounters:   10/03/18 197 lb (89.4 kg)   10/01/18 197 lb (89.4 kg)              Today, you had the following     No orders found for display       Primary Care Provider Office Phone # Fax #    Kahokaestevan Genao [phone] [address] [address]        Equal Access to Services     RACHEL JUNIOR : Hadii harjit Owens, waaxda ludonnaadaha, qaybta kaalmada ej, viky forde . So [facility] [phone].    ATENCIÓN: Si habla español, tiene a alatorre disposición servicios gratuitos de asistencia lingüística. [facility] [phone].    We comply with applicable federal civil rights laws and [place] laws. We do not discriminate on the basis of race, color, national origin, age, disability, sex, sexual orientation, or gender identity.            Thank you!     Thank you for choosing [facility]  for your care. Our goal is always to provide you with excellent care. Hearing back from our patients is one way we can continue to improve our services. Please take a few minutes to complete the written survey that you may receive in the mail after your visit with us. Thank you!             Your Updated Medication List - Protect others around you: Learn how to safely use, store and throw away your medicines at www.disposemymeds.org.          This list is accurate as of 10/8/18  5:28 PM.  Always use your most recent med list.                   Brand Name Dispense Instructions for use Diagnosis    acetaminophen 325 MG tablet    TYLENOL    100 tablet    Take 1-2 tablets (325-650 mg) by mouth every 6 hours as needed for mild pain    Acute pain of left knee       lisinopril 5 MG tablet     PRINIVIL/ZESTRIL    30 tablet    Take 1 tablet (5 mg) by mouth daily    Uncontrolled hypertension

## 2018-10-08 NOTE — TELEPHONE ENCOUNTER
Patient's clinic appointment tomorrow is cancelled.  If patient would like to see provider tomorrow, she should be rescheduled.

## 2018-10-08 NOTE — NURSING NOTE
Sena Saini is a 83 year old year old patient who comes in today for a Blood Pressure check because of ongoing blood pressure monitoring and recently started lisinopril at 10/1/18.  Pt is newly established with Dr Genao.    Vital Signs as repeated by RN:  166/88 pulse of 68  Recheck 5 min later is 146/86, pulse of 70.    Patient is taking medication as prescribed  Patient is tolerating medications well.  Patient is monitoring Blood Pressure at home.  Average readings if yes are 140/88, pulse of 68.  Current complaints: none  Disposition:  Pt has MULTIPLE questions and concerns.  She is returning tomorrow to see Dr Genao and address her concerns.  Pt is leaving for the winter to [place] in 3 weeks so she is eager to have her health concerns addressed beforehand.    Initially, when pt arrived for today's RN visit, pt thought that she was seeing Dr Genao instead.  She was upset that she had been scheduled with the RN.    Pt shares that she sees a naturopathic provider as well.  She says that her naturopathic cardiologist told her that lisinopril is one of the worst medications a person can be taking.    Pt brought in two boxes, one with all her prior records for Dr Genao to review.  She retrieved three items from these records as the ones most relevent that she wishes for Dr Genao to see.  I copied them, put labels on them, and have placed them at Dr [facility].  The items are thyroid studies completed by [facility] on 9/25/18, a narrative from Dr Munoz dated 1/19/17 in which pt was advised to stop lisinopril/hctz, and an older thyroid panel dated 9/29/14.  The second box includes all her naturopathic medications and supplements that she takes.  I offered to record these into her medication record but pt declined preferring to go over them with Dr Genao at tomorrow's appt.  Lastly, pt has disc with her from an abdominal CT that she had done at [facility] in June.  She intended  to go to diagnostics and ask that it be copied for her record.    Routed to Dr Anay FERRELL.    Ariadna Shah RN    BP Readings from Last 6 Encounters:   10/08/18 146/86   10/03/18 (!) 154/92   10/01/18 (!) 166/102     No results found for: CR  No results found for: POTASSIUM

## 2018-10-09 NOTE — TELEPHONE ENCOUNTER
She is having a cough already but wants to give it another week or two before deciding if it is tolerable or not.    Sejal Pierce, BSN, RN

## 2018-10-24 ENCOUNTER — OFFICE VISIT (OUTPATIENT)
Dept: FAMILY MEDICINE | Facility: CLINIC | Age: 83
End: 2018-10-24
Payer: COMMERCIAL

## 2018-10-24 VITALS
RESPIRATION RATE: 16 BRPM | BODY MASS INDEX: 28.44 KG/M2 | HEIGHT: 69 IN | WEIGHT: 192 LBS | TEMPERATURE: 98.7 F | OXYGEN SATURATION: 96 % | HEART RATE: 87 BPM | DIASTOLIC BLOOD PRESSURE: 72 MMHG | SYSTOLIC BLOOD PRESSURE: 124 MMHG

## 2018-10-24 DIAGNOSIS — I10 BENIGN ESSENTIAL HYPERTENSION: Primary | ICD-10-CM

## 2018-10-24 LAB
ANION GAP SERPL CALCULATED.3IONS-SCNC: 4 MMOL/L (ref 3–14)
BUN SERPL-MCNC: 22 MG/DL (ref 7–30)
CALCIUM SERPL-MCNC: 8.8 MG/DL (ref 8.5–10.1)
CHLORIDE SERPL-SCNC: 101 MMOL/L (ref 94–109)
CO2 SERPL-SCNC: 30 MMOL/L (ref 20–32)
CREAT SERPL-MCNC: 0.88 MG/DL (ref 0.52–1.04)
GFR SERPL CREATININE-BSD FRML MDRD: 62 ML/MIN/1.7M2
GLUCOSE SERPL-MCNC: 61 MG/DL (ref 70–99)
POTASSIUM SERPL-SCNC: 4 MMOL/L (ref 3.4–5.3)
SODIUM SERPL-SCNC: 135 MMOL/L (ref 133–144)

## 2018-10-24 PROCEDURE — 99213 OFFICE O/P EST LOW 20 MIN: CPT | Performed by: FAMILY MEDICINE

## 2018-10-24 PROCEDURE — 36415 COLL VENOUS BLD VENIPUNCTURE: CPT | Performed by: FAMILY MEDICINE

## 2018-10-24 PROCEDURE — 80048 BASIC METABOLIC PNL TOTAL CA: CPT | Performed by: FAMILY MEDICINE

## 2018-10-24 RX ORDER — LISINOPRIL 5 MG/1
5 TABLET ORAL DAILY
Qty: 90 TABLET | Refills: 3 | Status: SHIPPED | OUTPATIENT
Start: 2018-10-24 | End: 2019-08-07

## 2018-10-24 NOTE — PATIENT INSTRUCTIONS
Go to [facility] now for your blood draw.  You will be contacted in 1-2 days for results of your lab tests.    Blood pressure controlled now.  Continue lisinopril 2.5 mg daily.  Low salt, low fat diet.   Exercise: moderate intensity sustained for at least 30 mins per episode, goal of 150 mins per week at least  Schedule clinic visit when you return to [place] for a blood pressure recheck.    If you have any medical concern in [place], see a provider there.  You are advised to establish care with a primary care provider there too.      Thank you for choosing [facility].  You may be receiving a survey in the mail from [employer] regarding your visit today.  Please take a few minutes to complete and return the survey to let us know how we are doing.      If you have questions or concerns, please contact us via [employer] or you can contact your care team at [phone].    Our Clinic hours are:  Monday 6:40 am  to 7:00 pm  Tuesday -Friday 6:40 am to 5:00 pm    The [place] outpatient lab hours are:  Monday - Friday 6:10 am to 4:45 pm  Saturdays 7:00 am to 11:00 am  Appointments are required, call [phone]    If you have clinical questions after hours or would like to schedule an appointment,  call the clinic at [phone].

## 2018-10-24 NOTE — PROGRESS NOTES
SUBJECTIVE:   Sena Saini is a 83 year old female who presents to clinic today for the following health issues:      Hypertension Follow-up      Outpatient blood pressures are being checked at home.  Results are (brought readings from home).  Varies, has had some high readings and some normal.    Low Salt Diet: no added salt  Denies chest pain, dyspnea, HA, BOV, dizziness or urinary changes.    Amount of exercise or physical activity: minimal    Problems taking medications regularly: No    Medication side effects: cough - dry and very mild per patient - not as bad when she was on this on a higher dose    Diet: tries to avoid night shade    Verified above history with patient.      Problem list and histories reviewed & adjusted, as indicated.  Additional history: as documented    Patient Active Problem List   Diagnosis     Vaccine refused by patient     Problem with medical care compliance     Uncontrolled hypertension     Benign essential hypertension     Past Surgical History:   Procedure Laterality Date     APPENDECTOMY       COLONOSCOPY         Social History   Substance Use Topics     Smoking status: Former Smoker     Types: Cigarettes     Smokeless tobacco: Never Used     Alcohol use No     Family History   Problem Relation Age of Onset     Hypertension Mother      Coronary Artery Disease Mother      Colon Cancer Brother      Other Cancer Brother      Cerebrovascular Disease Maternal Grandmother      Coronary Artery Disease Maternal Grandfather      Alzheimer Disease Paternal Grandmother      Thyroid Disease Sister      Other Cancer Brother      lung     Rheumatoid Arthritis Sister          Current Outpatient Prescriptions   Medication Sig Dispense Refill     lisinopril (PRINIVIL/ZESTRIL) 5 MG tablet Take 1 tablet (5 mg) by mouth daily 90 tablet 3     acetaminophen (TYLENOL) 325 MG tablet Take 1-2 tablets (325-650 mg) by mouth every 6 hours as needed for mild pain (Patient not taking: Reported on 10/24/2018)  "100 tablet 0     [DISCONTINUED] lisinopril (PRINIVIL/ZESTRIL) 5 MG tablet Take 1 tablet (5 mg) by mouth daily 30 tablet 0     Allergies   Allergen Reactions     Penicillins      BP Readings from Last 3 Encounters:   10/24/18 124/72   10/08/18 146/86   10/03/18 (!) 154/92    Wt Readings from Last 3 Encounters:   10/24/18 192 lb (87.1 kg)   10/03/18 197 lb (89.4 kg)   10/01/18 197 lb (89.4 kg)                  Labs reviewed in EPIC    Reviewed and updated as needed this visit by clinical staff  Tobacco  Allergies  Meds  Problems  Med Hx  Surg Hx  Fam Hx  Soc Hx        Reviewed and updated as needed this visit by Provider  Allergies  Meds  Problems         ROS:  C: NEGATIVE for fever, chills or change in weight  I: NEGATIVE for worrisome rashes, moles or lesions  E: NEGATIVE for vision changes or irritation  R: NEGATIVE for significant cough or SOB  CV: NEGATIVE for chest pain, palpitations or peripheral edema  GI: NEGATIVE for nausea, abdominal pain, heartburn, or change in bowel habits  : NEGATIVE for frequency, dysuria, or hematuria  M: NEGATIVE for significant arthralgias or myalgia  N: NEGATIVE for weakness, dizziness or paresthesias  E: NEGATIVE for temperature intolerance, skin/hair changes  H: NEGATIVE for bleeding problems    OBJECTIVE:                                                    /72 (BP Location: Right arm, Patient Position: Chair, Cuff Size: Adult Regular)  Pulse 87  Temp 98.7  F (37.1  C) (Tympanic)  Resp 16  Ht 5' 9\" (1.753 m)  Wt 192 lb (87.1 kg)  SpO2 96%  BMI 28.35 kg/m2  Body mass index is 28.35 kg/(m^2).  GENERAL: alert and no distress, ambulatory w/o assist  NECK: no tenderness, no adenopathy,  Thyroid not enlarged  RESP: lungs clear to auscultation - no rales, no rhonchi, no wheezes  CV: regular rates and rhythm, no murmur  MS: no edema  SKIN: no suspicious lesions, no rashes  NEURO: strength and tone- normal, sensory exam- grossly normal, mentation- intact, speech- " normal, reflexes- symmetric  ABD:  nontender    Diagnostic test results:  Diagnostic Test Results:  Results for orders placed or performed in visit on 10/24/18 (from the past 24 hour(s))   Basic metabolic panel   Result Value Ref Range    Sodium 135 133 - 144 mmol/L    Potassium 4.0 3.4 - 5.3 mmol/L    Chloride 101 94 - 109 mmol/L    Carbon Dioxide 30 20 - 32 mmol/L    Anion Gap 4 3 - 14 mmol/L    Glucose 61 (L) 70 - 99 mg/dL    Urea Nitrogen 22 7 - 30 mg/dL    Creatinine 0.88 0.52 - 1.04 mg/dL    GFR Estimate 62 >60 mL/min/1.7m2    GFR Estimate If Black 75 >60 mL/min/1.7m2    Calcium 8.8 8.5 - 10.1 mg/dL        ASSESSMENT/PLAN:                                                        ICD-10-CM    1. Benign essential hypertension I10 Basic metabolic panel     lisinopril (PRINIVIL/ZESTRIL) 5 MG tablet     Controlled.  Low salt, low fat diet.   Exercise as tolerated.  Take meds as prescribed; call if with side effects.   Offered patient to change to different med due to dry cough - she preferred to stay with lisinopril for now.  Patient may contact care team as needed if with bothersome cough - consider losartan or amlodipine then.  Return precautions discussed and given to patient.      Follow up with Provider - 6 months when she comes back from [place]   Patient Instructions   Go to Clinic [facility] now for your blood draw.  You will be contacted in 1-2 days for results of your lab tests.    Blood pressure controlled now.  Continue lisinopril 2.5 mg daily.  Low salt, low fat diet.   Exercise: moderate intensity sustained for at least 30 mins per episode, goal of 150 mins per week at least  Schedule clinic visit when you return to [place] for a blood pressure recheck.    If you have any medical concern in [place], see a provider there.  You are advised to establish care with a primary care provider there too.      Thank you for choosing [facility].  You may be receiving a survey in the mail from [employer] regarding  your visit today.  Please take a few minutes to complete and return the survey to let us know how we are doing.      If you have questions or concerns, please contact us via [email or web address] or you can contact your care team at [phone].    Our Clinic hours are:  Monday 6:40 am  to 7:00 pm  Tuesday -Friday 6:40 am to 5:00 pm    The [place] outpatient lab hours are:  Monday - Friday 6:10 am to 4:45 pm  Saturdays 7:00 am to 11:00 am  Appointments are required, call [phone]    If you have clinical questions after hours or would like to schedule an appointment,  call the clinic at [phone].        Neno Genao MD  [facility]

## 2018-10-24 NOTE — MR AVS SNAPSHOT
After Visit Summary   10/24/2018    Sena Saini    MRN: [id number]           Patient Information     Date Of Birth          1935        Visit Information        Provider Department      10/24/2018 9:20 AM Neno Genao MD [facility]        Today's Diagnoses     Benign essential hypertension    -  1      Care Instructions    Go to Clinic [facility] now for your blood draw.  You will be contacted in 1-2 days for results of your lab tests.    Blood pressure controlled now.  Continue lisinopril 2.5 mg daily.  Low salt, low fat diet.   Exercise: moderate intensity sustained for at least 30 mins per episode, goal of 150 mins per week at least  Schedule clinic visit when you return to [place] for a blood pressure recheck.    If you have any medical concern in [place], see a provider there.  You are advised to establish care with a primary care provider there too.      Thank you for choosing [facility].  You may be receiving a survey in the mail from [facility] regarding your visit today.  Please take a few minutes to complete and return the survey to let us know how we are doing.      If you have questions or concerns, please contact us via [employer] or you can contact your care team at [phone].    Our Clinic hours are:  Monday 6:40 am  to 7:00 pm  Tuesday -Friday 6:40 am to 5:00 pm    The [place] outpatient lab hours are:  Monday - Friday 6:10 am to 4:45 pm  Saturdays 7:00 am to 11:00 am  Appointments are required, call [phone]    If you have clinical questions after hours or would like to schedule an appointment,  call the clinic at [phone].            Follow-ups after your visit        Follow-up notes from your care team     Return in about 6 months (around 4/24/2019).      Who to contact     If you have questions or need follow up information about today's clinic visit or your schedule please contact [facility] directly at [phone].  Normal  "or non-critical lab and imaging results will be communicated to you by Neovacshart, letter or phone within 4 business days after the clinic has received the results. If you do not hear from us within 7 days, please contact the clinic through [employer] or phone. If you have a critical or abnormal lab result, we will notify you by phone as soon as possible.  Submit refill requests through Appiterate or call your pharmacy and they will forward the refill request to us. Please allow 3 business days for your refill to be completed.          Additional Information About Your Visit        Neovacshar[employer]hf Taxi Information     Appiterate lets you send messages to your doctor, view your test results, renew your prescriptions, schedule appointments and more. To sign up, go to [email or web address] . Click on \"Log in\" on the left side of the screen, which will take you to the Welcome page. Then click on \"Sign up Now\" on the right side of the page.     You will be asked to enter the access code listed below, as well as some personal information. Please follow the directions to create your username and password.     Your access code is: [email or web address]  Expires: 2018  7:34 AM     Your access code will  in 90 days. If you need help or a new code, please call your [place] clinic or [phone].        Care EveryWhere ID     This is your Care EveryWhere ID. This could be used by other organizations to access your [place] medical records  [id number]        Your Vitals Were     Pulse Temperature Respirations Height Pulse Oximetry BMI (Body Mass Index)    87 98.7  F (37.1  C) (Tympanic) 16 5' 9\" (1.753 m) 96% 28.35 kg/m2       Blood Pressure from Last 3 Encounters:   10/24/18 124/72   10/08/18 146/86   10/03/18 (!) 154/92    Weight from Last 3 Encounters:   10/24/18 192 lb (87.1 kg)   10/03/18 197 lb (89.4 kg)   10/01/18 197 lb (89.4 kg)              We Performed the Following     Basic metabolic panel          Where to get your medicines    "   These medications were sent to [id number] [place] - [address]  [address], [place] [address]     Phone:  [phone]     lisinopril 5 MG tablet          Primary Care Provider Office Phone # Fax #    Neno Edwin Genao [phone] [address] [facility] [address]        Equal Access to Services     RACHEL JUNIOR : Hadii aad ku hadasho Soomaali, waaxda luqadaha, qaybta kaalmada adeegyada, waxay idiin hayaan adeeg khdanielsh la'jyoti . So [facility] [phone].    ATENCIÓN: Si habla espjelly, tiene a alatorre disposición servicios gratuitos de asistencia lingüística. Addi al [phone].    We comply with applicable federal civil rights laws and [place] laws. We do not discriminate on the basis of race, color, national origin, age, disability, sex, sexual orientation, or gender identity.            Thank you!     Thank you for choosing [facility]  for your care. Our goal is always to provide you with excellent care. Hearing back from our patients is one way we can continue to improve our services. Please take a few minutes to complete the written survey that you may receive in the mail after your visit with us. Thank you!             Your Updated Medication List - Protect others around you: Learn how to safely use, store and throw away your medicines at www.disposemymeds.org.          This list is accurate as of 10/24/18 10:20 AM.  Always use your most recent med list.                   Brand Name Dispense Instructions for use Diagnosis    acetaminophen 325 MG tablet    TYLENOL    100 tablet    Take 1-2 tablets (325-650 mg) by mouth every 6 hours as needed for mild pain    Acute pain of left knee       lisinopril 5 MG tablet    PRINIVIL/ZESTRIL    90 tablet    Take 1 tablet (5 mg) by mouth daily    Benign essential hypertension

## 2019-07-20 ENCOUNTER — TRANSFERRED RECORDS (OUTPATIENT)
Dept: HEALTH INFORMATION MANAGEMENT | Facility: CLINIC | Age: 84
End: 2019-07-20

## 2019-08-07 DIAGNOSIS — I10 BENIGN ESSENTIAL HYPERTENSION: ICD-10-CM

## 2019-08-07 RX ORDER — LISINOPRIL 5 MG/1
TABLET ORAL
Qty: 30 TABLET | Refills: 0 | Status: SHIPPED | OUTPATIENT
Start: 2019-08-07 | End: 2019-08-13

## 2019-08-07 NOTE — TELEPHONE ENCOUNTER
Medication is being filled for 1 time refill only due to:  Patient needs to be seen because she is due for OV.   Has appt 8-13-19.  Sylvia SLOAN RN

## 2019-08-13 ENCOUNTER — OFFICE VISIT (OUTPATIENT)
Dept: FAMILY MEDICINE | Facility: CLINIC | Age: 84
End: 2019-08-13
Payer: COMMERCIAL

## 2019-08-13 VITALS
BODY MASS INDEX: 29.41 KG/M2 | HEIGHT: 67 IN | WEIGHT: 187.4 LBS | OXYGEN SATURATION: 97 % | DIASTOLIC BLOOD PRESSURE: 90 MMHG | TEMPERATURE: 98 F | SYSTOLIC BLOOD PRESSURE: 172 MMHG | HEART RATE: 69 BPM | RESPIRATION RATE: 14 BRPM

## 2019-08-13 DIAGNOSIS — I10 UNCONTROLLED HYPERTENSION: ICD-10-CM

## 2019-08-13 DIAGNOSIS — E78.5 DYSLIPIDEMIA (HIGH LDL; LOW HDL): ICD-10-CM

## 2019-08-13 DIAGNOSIS — R39.15 URINARY URGENCY: ICD-10-CM

## 2019-08-13 DIAGNOSIS — M54.31 RIGHT SIDED SCIATICA: ICD-10-CM

## 2019-08-13 DIAGNOSIS — M25.562 CHRONIC PAIN OF BOTH KNEES: ICD-10-CM

## 2019-08-13 DIAGNOSIS — Z12.39 SCREENING FOR BREAST CANCER: ICD-10-CM

## 2019-08-13 DIAGNOSIS — Z13.220 LIPID SCREENING: ICD-10-CM

## 2019-08-13 DIAGNOSIS — G89.29 CHRONIC PAIN OF BOTH KNEES: ICD-10-CM

## 2019-08-13 DIAGNOSIS — Z23 NEED FOR VACCINATION: ICD-10-CM

## 2019-08-13 DIAGNOSIS — Z12.4 SCREENING FOR CERVICAL CANCER: ICD-10-CM

## 2019-08-13 DIAGNOSIS — M25.561 CHRONIC PAIN OF BOTH KNEES: ICD-10-CM

## 2019-08-13 DIAGNOSIS — K76.89 HEPATIC CYST: ICD-10-CM

## 2019-08-13 DIAGNOSIS — Z00.00 ENCOUNTER FOR MEDICARE ANNUAL WELLNESS EXAM: Primary | ICD-10-CM

## 2019-08-13 LAB
ALBUMIN UR-MCNC: NEGATIVE MG/DL
ANION GAP SERPL CALCULATED.3IONS-SCNC: 6 MMOL/L (ref 3–14)
APPEARANCE UR: CLEAR
BILIRUB UR QL STRIP: NEGATIVE
BUN SERPL-MCNC: 19 MG/DL (ref 7–30)
CALCIUM SERPL-MCNC: 9 MG/DL (ref 8.5–10.1)
CHLORIDE SERPL-SCNC: 101 MMOL/L (ref 94–109)
CO2 SERPL-SCNC: 29 MMOL/L (ref 20–32)
COLOR UR AUTO: YELLOW
CREAT SERPL-MCNC: 0.82 MG/DL (ref 0.52–1.04)
GFR SERPL CREATININE-BSD FRML MDRD: 66 ML/MIN/{1.73_M2}
GLUCOSE SERPL-MCNC: 87 MG/DL (ref 70–99)
GLUCOSE UR STRIP-MCNC: NEGATIVE MG/DL
HGB UR QL STRIP: ABNORMAL
KETONES UR STRIP-MCNC: NEGATIVE MG/DL
LDLC SERPL DIRECT ASSAY-MCNC: 174 MG/DL
LEUKOCYTE ESTERASE UR QL STRIP: ABNORMAL
NITRATE UR QL: NEGATIVE
NON-SQ EPI CELLS #/AREA URNS LPF: ABNORMAL /LPF
PH UR STRIP: 5.5 PH (ref 5–7)
POTASSIUM SERPL-SCNC: 3.7 MMOL/L (ref 3.4–5.3)
RBC #/AREA URNS AUTO: ABNORMAL /HPF
SODIUM SERPL-SCNC: 136 MMOL/L (ref 133–144)
SOURCE: ABNORMAL
SP GR UR STRIP: <=1.005 (ref 1–1.03)
UROBILINOGEN UR STRIP-ACNC: 0.2 EU/DL (ref 0.2–1)
WBC #/AREA URNS AUTO: ABNORMAL /HPF

## 2019-08-13 PROCEDURE — 83721 ASSAY OF BLOOD LIPOPROTEIN: CPT | Performed by: FAMILY MEDICINE

## 2019-08-13 PROCEDURE — 99397 PER PM REEVAL EST PAT 65+ YR: CPT | Mod: 25 | Performed by: FAMILY MEDICINE

## 2019-08-13 PROCEDURE — 81001 URINALYSIS AUTO W/SCOPE: CPT | Performed by: FAMILY MEDICINE

## 2019-08-13 PROCEDURE — 90471 IMMUNIZATION ADMIN: CPT | Performed by: FAMILY MEDICINE

## 2019-08-13 PROCEDURE — 99213 OFFICE O/P EST LOW 20 MIN: CPT | Mod: 25 | Performed by: FAMILY MEDICINE

## 2019-08-13 PROCEDURE — 36415 COLL VENOUS BLD VENIPUNCTURE: CPT | Performed by: FAMILY MEDICINE

## 2019-08-13 PROCEDURE — 90715 TDAP VACCINE 7 YRS/> IM: CPT | Performed by: FAMILY MEDICINE

## 2019-08-13 PROCEDURE — 80048 BASIC METABOLIC PNL TOTAL CA: CPT | Performed by: FAMILY MEDICINE

## 2019-08-13 RX ORDER — LISINOPRIL 5 MG/1
10 TABLET ORAL DAILY
Start: 2019-08-13 | End: 2019-08-21

## 2019-08-13 ASSESSMENT — ACTIVITIES OF DAILY LIVING (ADL): CURRENT_FUNCTION: NO ASSISTANCE NEEDED

## 2019-08-13 ASSESSMENT — MIFFLIN-ST. JEOR: SCORE: 1334.49

## 2019-08-13 NOTE — PROGRESS NOTES
"SUBJECTIVE:   Sena Flores is a 84 year old female who presents for Preventive Visit.    Are you in the first 12 months of your Medicare coverage?  No    Healthy Habits:    In general, how would you rate your overall health?  Fair    Frequency of exercise:  None    Do you usually eat at least 4 servings of fruit and vegetables a day, include whole grains    & fiber and avoid regularly eating high fat or \"junk\" foods?  Yes    Taking medications regularly:  Yes    Barriers to taking medications:  None    Medication side effects:  Other    Ability to successfully perform activities of daily living:  No assistance needed    Home Safety:  No safety concerns identified    Hearing Impairment:  No hearing concerns    In the past 6 months, have you been bothered by leaking of urine? Yes    In general, how would you rate your overall mental or emotional health?  Very good      PHQ-2 Total Score:    Additional concerns today:  Yes     Patient was advised that concern below is separate from preventive exam and may be billed as a separate encounter.  He verbalized understanding and would like to address today.    Concerns:  * bilateral knee pain  * right hip pain     Musculoskeletal problem/pain      Duration: 5 yrs B knee pain; 5 weeks right hip pain    Description  Location: see above    Intensity:  moderate    Accompanying signs and symptoms: none    History  Previous similar problem: no   Previous evaluation:  none    Precipitating or alleviating factors:  Trauma or overuse: no   Aggravating factors include: walking, climbing stairs and getting up from chair after sitting for a long period    Therapies tried and outcome: massage, NSAID - Naproxen and chiropractor - variable relief    Verified above history with patient.    Hypertension Follow-up      Do you check your blood pressure regularly outside of the clinic? Yes     Are you following a low salt diet? No     Are your blood pressures ever more than 140 on the top " number (systolic) OR more   than 90 on the bottom number (diastolic), for example 140/90? Yes - patient states has been having high BP for months now  Patient states she has been taking lisinopril 5 mg daily.  Denies chest pain, dyspnea, HA, BOV, dizziness or urinary changes.    Do you feel safe in your environment? Yes    Do you have a Health Care Directive? Yes: Patient states has Advance Directive and will bring in a copy to clinic.      Fall risk  Fallen 2 or more times in the past year?: No  Any fall with injury in the past year?: No    Cognitive Screening   1) Repeat 3 items (Leader, Season, Table)    2) Clock draw: NORMAL  3) 3 item recall: Recalls 1 object   Results: NORMAL clock, 1-2 items recalled: COGNITIVE IMPAIRMENT LESS LIKELY    Mini-CogTM Copyright S Teresa. Licensed by the author for use in [facility]; reprinted with permission ([email or web address]). All rights reserved.      Do you have sleep apnea, excessive snoring or daytime drowsiness?: no    Reviewed and updated as needed this visit by clinical staff  Tobacco  Allergies  Meds  Problems  Med Hx  Surg Hx  Fam Hx       Reviewed and updated as needed this visit by Provider  Tobacco  Allergies  Meds  Problems  Med Hx  Surg Hx  Fam Hx        Social History     Tobacco Use     Smoking status: Former Smoker     Types: Cigarettes     Smokeless tobacco: Never Used   Substance Use Topics     Alcohol use: No     If you drink alcohol do you typically have >3 drinks per day or >7 drinks per week? No    Alcohol Use 8/13/2019   Prescreen: >3 drinks/day or >7 drinks/week? No     Patient denies BM changes.  No fam hx of colon cancer.  Patient states she has been experiencing urinary urgency, dribbling, and nocturia for the last few months.      Current providers sharing in care for this patient include:   Patient Care Team:  Neno Genao MD as PCP - General (Family Practice)  Neno Genao MD as Assigned PCP    The  following health maintenance items are reviewed in Epic and correct as of today:  Health Maintenance   Topic Date Due     DEXA  1935     ADVANCE CARE PLANNING  1935     DTAP/TDAP/TD IMMUNIZATION (1 - Tdap) 03/11/1961     ZOSTER IMMUNIZATION (1 of 2) 03/11/1986     MEDICARE ANNUAL WELLNESS VISIT  03/11/2001     PNEUMOCOCCAL IMMUNIZATION 65+ LOW/MEDIUM RISK (1 of 2 - PCV13) 03/11/2001     PHQ-2  01/01/2019     INFLUENZA VACCINE (1) 09/01/2019     FALL RISK ASSESSMENT  10/01/2019     IPV IMMUNIZATION  Aged Out     MENINGITIS IMMUNIZATION  Aged Out     Labs reviewed in EPIC  BP Readings from Last 3 Encounters:   08/13/19 (!) 172/90   10/24/18 124/72   10/08/18 146/86    Wt Readings from Last 3 Encounters:   08/13/19 85 kg (187 lb 6.4 oz)   10/24/18 87.1 kg (192 lb)   10/03/18 89.4 kg (197 lb)                  Patient Active Problem List   Diagnosis     Vaccine refused by patient     Problem with medical care compliance     Uncontrolled hypertension     Benign essential hypertension     Past Surgical History:   Procedure Laterality Date     APPENDECTOMY       COLONOSCOPY         Social History     Tobacco Use     Smoking status: Former Smoker     Types: Cigarettes     Smokeless tobacco: Never Used   Substance Use Topics     Alcohol use: No     Family History   Problem Relation Age of Onset     Hypertension Mother      Coronary Artery Disease Mother      Colon Cancer Brother      Other Cancer Brother      Cerebrovascular Disease Maternal Grandmother      Coronary Artery Disease Maternal Grandfather      Alzheimer Disease Paternal Grandmother      Thyroid Disease Sister      Other Cancer Brother         lung     Rheumatoid Arthritis Sister          Current Outpatient Medications   Medication Sig Dispense Refill     acetaminophen (TYLENOL) 325 MG tablet Take 1-2 tablets (325-650 mg) by mouth every 6 hours as needed for mild pain 100 tablet 0     lisinopril (PRINIVIL/ZESTRIL) 5 MG tablet Take 2 tablets (10 mg) by  "mouth daily       Allergies   Allergen Reactions     Penicillins      Pneumonia Vaccine:Adults age 65+ who have not received previous Pneumovax (PPSV23) or PCV13 as an adult: Should first be given PCV13 AND then should be given PPSV23 6-12 months after PCV13  Mammogram Screening: Patient over age 75, has elected to stop mammography screening.  History of abnormal Pap smear: NO - age 65 - see link Cervical Cytology Screening Guidelines    Review of Systems  C: NEGATIVE for fever, chills, or change in weight  I: NEGATIVE for worrisome rashes, moles or lesions  E: NEGATIVE for vision changes or irritation  ENT: NEGATIVE for ear, mouth and throat problems  R: NEGATIVE for significant cough or SOB  CV: NEGATIVE for chest pain, palpitations or peripheral edema  GI: NEGATIVE for nausea, abdominal pain, heartburn, or change in bowel habits  :negative for dysuria, hematuria, decreased urinary stream, or discharge  M: see above  N: NEGATIVE for weakness, dizziness or paresthesias  E: NEGATIVE for temperature intolerance, skin/hair changes  H: NEGATIVE for bleeding problems  P: NEGATIVE for changes in mood or affect    OBJECTIVE:   BP (!) 172/90   Pulse 69   Temp 98  F (36.7  C) (Tympanic)   Resp 14   Ht 1.697 m (5' 6.8\")   Wt 85 kg (187 lb 6.4 oz)   SpO2 97%   Breastfeeding? No   BMI 29.53 kg/m   Estimated body mass index is 29.53 kg/m  as calculated from the following:    Height as of this encounter: 1.697 m (5' 6.8\").    Weight as of this encounter: 85 kg (187 lb 6.4 oz).  Physical Exam  GENERAL APPEARANCE: , alert and no distress  EYES: Eyes grossly normal to inspection, PERRL and conjunctivae and sclerae normal  HENT: ear canals and TM's normal, nose and mouth without ulcers or lesions, oropharynx clear and oral mucous membranes moist  NECK: no adenopathy, no asymmetry, masses, or scars and thyroid normal to palpation  RESP: lungs clear to auscultation - no rales, rhonchi or wheezes  BREAST: normal without " masses, tenderness or nipple discharge and no palpable axillary masses or adenopathy  CV: regular rate and rhythm, normal S1 S2, no S3 or S4, no murmur, click or rub, no peripheral edema and peripheral pulses strong  ABDOMEN: soft, nontender, no hepatosplenomegaly, no masses and bowel sounds normal   (female): deferred per patient.  BREAST: symmetrical, nipples: not retracted and no erosion; no TTP; no palpable mass bilaterally; no axillary adenopathy  MS: no musculoskeletal defects are noted and gait is age appropriate without ataxia   BILAT KNEES: right knee with trace effusion inferomedial aspect with no TTP; full range of motion with mild pain on extension of only the right knee; no crepitation either side; negative drawer signs; negative pain on valgus/varus stress.  BACK: no TTP; SLR negative on left but positive on right at 40 degrees with increased tingling on lateral ankle on dorsiflexion of foot  SKIN: no suspicious lesions or rashes; on right gluteal area, there is a mildly tender 5 mm deep subcutaneous nodule with no overlying induration or fluctuance  NEURO: Normal strength and tone, sensory exam grossly normal, mentation intact and speech normal  PSYCH: mentation appears normal and affect normal/bright    Diagnostic Test Results:  none     ASSESSMENT / PLAN:   Sena was seen today for medicare visit.    Diagnoses and all orders for this visit:    Encounter for Medicare annual wellness exam  -     Cancel: Pap imaged thin layer screen with HPV - recommended age 30 - 65 years (select HPV order below)  Patient was advised on recommended screening and preventive health recommendations.  He verbalized understanding and agreed to the plans below.    Uncontrolled hypertension  -     Basic metabolic panel  -     lisinopril (PRINIVIL/ZESTRIL) 5 MG tablet; Take 2 tablets (10 mg) by mouth daily - incresesed from 1 tablet a day.  -     OFFICE/OUTPT VISIT,EST,LEVL IV  Discussed low salt diet recommendation.  "Patient said she adds \"a pinch of salt every time I drink water\" to replenish electrolytes; patient was advised this may be harmful to her due to her HBP. Advised to stop the practice.  Recheck with RN 1 week.    Urinary urgency  -     UROLOGY ADULT REFERRAL  -     UA with Microscopic reflex to Culture  -     OFFICE/OUTPT VISIT,EST,LEVL IV  Pelvic floor weakness? Doubt UTI due to duration.  Patient concurred to the above.    Chronic pain of both knees  -     XR Knee Standing Bilateral 3 Views; Future  -     OFFICE/OUTPT VISIT,EST,LEVL IV  Suspect DJD.  Image knees. If with significant DJD, consult ortho.  Advised safe use of otc APAP for pain.  Activity as tolerated.    Right sided sciatica  -     MR Lumbar Spine w/o Contrast; Future  -     OFFICE/OUTPT VISIT,EST,LEVL IV  On exam, more consistent with sciatica than hip pain.  Due to age, and persistent \"tingling on ankle\", recommended MRI to r/o DDD vs disc herniation.  Patient concurred.  Refer to ortho spine if with significant abnormal findings.  Activity as tolerated. Rest as needed.  Return precautions discussed and given to patient.    Screening for breast cancer  Patient declined.    Screening for cervical cancer  -     Cancel: Pap imaged thin layer screen with HPV - recommended age 30 - 65 years (select HPV order below)  Patient initially insisted on pap smear to be done. She was advised of current guidelines in cervical cancer screening, and that paps are in general not recommended ater 65 yrs old. Patient was advised that medicare may or may not cover the test - she deferred it then.      Lipid screening  -     LDL cholesterol direct    Hepatic cyst  At end of encounter, patient showed several ultrasound images that she reportedly have had done yearly through an independent \"screening service\" that performs numerous tests of ultrsounds of various organs, blood tests and imaging. She mentioned she has had hepatic cysts they found but patient never went to a " "provider for.  No official reading of the ultrasounds available.  Patient was advised to assess the cysts in more detail and to obtain an accurate interpretation, repeat US of the liver may be ordered.  She declined.    Need for vaccination  -     TDAP VACCINE (ADACEL) [20833.002]  -     1st  Administration  [45028]  Patient was advised of value of TDAP and pneumococcal vaccinations. Patient has not had these since she turned 66 yrs old.  Discussed with patient benefits and possible ADR to the vaccines.  Patient states she has always been against vaccines. She took a while to decide but eventually preferred to have only the TDAP.  Patient was advised to reconsider pneumonia vaccine due to the condition being a leading cause of morbidity and mortality in her age group. She replied by saying \"I've been always healthy so I  lnot get pneumonia\".    In addition to preventive health visit, spent another 30 minutes in counseling and coordination of care as above.    End of Life Planning:  Patient currently has an advanced directive: No.  I have verified the patient's ablity to prepare an advanced directive/make health care decisions.  Literature was provided to assist patient in preparing an advanced directive.    COUNSELING:  Reviewed preventive health counseling, as reflected in patient instructions       Regular exercise       Healthy diet/nutrition       Vision screening       Hearing screening       Dental care       Bladder control       Fall risk prevention       Immunizations    Vaccinated for: TDAP and Declined: Pneumococcal due to Conscientious objector               Aspirin Prophylaxsis       Alcohol Use       Osteoporosis Prevention/Bone Health    Estimated body mass index is 29.53 kg/m  as calculated from the following:    Height as of this encounter: 1.697 m (5' 6.8\").    Weight as of this encounter: 85 kg (187 lb 6.4 oz).    Weight management plan: Discussed healthy diet and exercise guidelines     " reports that she has quit smoking. Her smoking use included cigarettes. She has never used smokeless tobacco.      Appropriate preventive services were discussed with this patient, including applicable screening as appropriate for cardiovascular disease, diabetes, osteopenia/osteoporosis, and glaucoma.  As appropriate for age/gender, discussed screening for colorectal cancer, prostate cancer, breast cancer, and cervical cancer. Checklist reviewing preventive services available has been given to the patient.    Reviewed patients plan of care and provided an AVS. The Basic Care Plan (routine screening as documented in Health Maintenance) and Complex Care Plan (for patients with higher acuity and needing more deliberate coordination of services) for Sena meets the Care Plan requirement. This Care Plan has been established and reviewed with the Patient and spouse.    Counseling Resources:  ATP IV Guidelines  Pooled Cohorts Equation Calculator  Breast Cancer Risk Calculator  FRAX Risk Assessment  ICSI Preventive Guidelines  Dietary Guidelines for Americans, 2010  USDA's MyPlate  ASA Prophylaxis  Lung CA Screening    Neno Genao MD  [facility] [place]    Identified Health Risks:

## 2019-08-13 NOTE — PATIENT INSTRUCTIONS
You will be contacted in 1-2 days for results of your lab tests.    Increase lisinopril to 10 mg daily.  Schedule nurse visit in 1 week for blood pressure recheck.  Low salt diet; refer to the dietary recommendations.    Schedule urology consult.    To schedule the MRI, call [phone].    Depending on result of the xray and MRI, further recomemndations will be made.    You deferred repeating hepatic ultrasound.    Patient Education   Personalized Prevention Plan  You are due for the preventive services outlined below.  Your care team is available to assist you in scheduling these services.  If you have already completed any of these items, please share that information with your care team to update in your medical record.  Health Maintenance Due   Topic Date Due     Osteoporosis Screening  1935     Discuss Advance Care Planning  1935     Diptheria Tetanus Pertussis (DTAP/TDAP/TD) Vaccine (1 - Tdap) 03/11/1961     Zoster (Shingles) Vaccine (1 of 2) 03/11/1986     Annual Wellness Visit  03/11/2001     Pneumococcal Vaccine (1 of 2 - PCV13) 03/11/2001     PHQ-2  01/01/2019     Preventive Health Recommendations    See your health care provider every year to    Review health changes.     Discuss preventive care.      Review your medicines if your doctor has prescribed any.    You no longer need a yearly Pap test unless you've had an abnormal Pap test in the past 10 years. If you have vaginal symptoms, such as bleeding or discharge, be sure to talk with your provider about a Pap test.    Every 1 to 2 years, have a mammogram.  If you are over 69, talk with your health care provider about whether or not you want to continue having screening mammograms.    Every 10 years, have a colonoscopy. Or, have a yearly FIT test (stool test). These exams will check for colon cancer.     Have a cholesterol test every 5 years, or more often if your doctor advises it.     Have a diabetes test (fasting glucose) every three  years. If you are at risk for diabetes, you should have this test more often.     At age 65, have a bone density scan (DEXA) to check for osteoporosis (brittle bone disease).    Shots:    Get a flu shot each year.    Get a tetanus shot every 10 years.    Talk to your doctor about your pneumonia vaccines. There are now two you should receive - Pneumovax (PPSV 23) and Prevnar (PCV 13).    Talk to your pharmacist about the shingles vaccine.    Talk to your doctor about the hepatitis B vaccine.    Nutrition:     Eat at least 5 servings of fruits and vegetables each day.    Eat whole-grain bread, whole-wheat pasta and brown rice instead of white grains and rice.    Get adequate Calcium and Vitamin D.     Lifestyle    Exercise at least 150 minutes a week (30 minutes a day, 5 days a week). This will help you control your weight and prevent disease.    Limit alcohol to one drink per day.    No smoking.     Wear sunscreen to prevent skin cancer.     See your dentist twice a year for an exam and cleaning.    See your eye doctor every 1 to 2 years to screen for conditions such as glaucoma, macular degeneration and cataracts.    Personalized Prevention Plan  You are due for the preventive services outlined below.  Your care team is available to assist you in scheduling these services.  If you have already completed any of these items, please share that information with your care team to update in your medical record.  Health Maintenance Due   Topic Date Due     Osteoporosis Screening  1935     Discuss Advance Care Planning  1935     Diptheria Tetanus Pertussis (DTAP/TDAP/TD) Vaccine (1 - Tdap) 03/11/1961     Zoster (Shingles) Vaccine (1 of 2) 03/11/1986     Annual Wellness Visit  03/11/2001     Pneumococcal Vaccine (1 of 2 - PCV13) 03/11/2001     PHQ-2  01/01/2019        Patient Education     Low-Salt Diet  This diet removes foods that are high in salt. It also limits the amount of salt you use when cooking. It is  most often used for people with high blood pressure, edema (fluid retention), and kidney, liver, or heart disease.  Table salt contains the mineral sodium. Your body needs sodium to work normally. But too much sodium can make your health problems worse. Your healthcare provider is recommending a low-salt (also called low-sodium) diet for you. Your total daily allowance of salt is 1,500 to 2,300 milligrams (mg). It is less than 1 teaspoon of table salt. This means you can have only about 500 to 700 mg of sodium at each meal. People with certain health problems should limit salt intake to the lower end of the recommended range.    When you cook, don t add much salt. If you can cook without using salt, even better. Don t add salt to your food at the table.  When shopping, read food labels. Salt is often called sodium on the label. Choose foods that are salt-free, low salt, or very low salt. Note that foods with reduced salt may not lower your salt intake enough.    Beans, potatoes, and pasta  Ok: Dry beans, split peas, lentils, potatoes, rice, macaroni, pasta, spaghetti without added salt  Avoid: Potato chips, tortilla chips, and similar products  Breads and cereals  Ok: Low-sodium breads, rolls, cereals, and cakes; low-salt crackers, matzo crackers  Avoid: Salted crackers, pretzels, popcorn, Bengali toast, pancakes, muffins  Dairy  Ok: Milk, chocolate milk, hot chocolate mix, low-salt cheeses, and yogurt  Avoid: Processed cheese and cheese spreads; Roquefort, Camembert, and cottage cheese; buttermilk, instant breakfast drink  Desserts  Ok: Ice cream, frozen yogurt, juice bars, gelatin, cookies and pies, sugar, honey, jelly, hard candy  Avoid: Most pies, cakes and cookies prepared or processed with salt; instant pudding  Drinks  Ok: Tea, coffee, fizzy (carbonated) drinks, juices  Avoid: Flavored coffees, electrolyte replacement drinks, sports drinks  Meats  Ok: All fresh meat, fish, poultry, low-salt tuna, eggs, egg  substitute  Avoid: Smoked, pickled, brine-cured, or salted meats and fish. This includes wright, chipped beef, corned beef, hot dogs, deli meats, ham, kosher meats, salt pork, sausage, canned tuna, salted codfish, smoked salmon, herring, sardines, or anchovies.  Seasonings and spices  Ok: Most seasonings are okay. Good substitutes for salt include: fresh herb blends, hot sauce, lemon, garlic, mtz, vinegar, dry mustard, parsley, cilantro, horseradish, tomato paste, regular margarine, mayonnaise, unsalted butter, cream cheese, vegetable oil, cream, low-salt salad dressing and gravy.  Avoid: Regular ketchup, relishes, pickles, soy sauce, teriyaki sauce, Worcestershire sauce, BBQ sauce, tartar sauce, meat tenderizer, chili sauce, regular gravy, regular salad dressing, salted butter  Soups  Ok: Low-salt soups and broths made with allowed foods  Avoid: Bouillon cubes, soups with smoked or salted meats, regular soup and broth  Vegetables  Ok: Most vegetables are okay; also low-salt tomato and vegetable juices  Avoid: Sauerkraut and other brine-soaked vegetables; pickles and other pickled vegetables; tomato juice, olives  Date Last Reviewed: 8/1/2016 2000-2018 [employer]. [address] [place] [address], [place] [address]. All rights reserved. This information is not intended as a substitute for professional medical care. Always follow your healthcare professional's instructions.

## 2019-08-13 NOTE — NURSING NOTE
"Initial BP (!) 186/101   Pulse 69   Temp 98  F (36.7  C) (Tympanic)   Ht 1.697 m (5' 6.8\")   Wt 85 kg (187 lb 6.4 oz)   SpO2 97%   Breastfeeding? No   BMI 29.53 kg/m   Estimated body mass index is 29.53 kg/m  as calculated from the following:    Height as of this encounter: 1.697 m (5' 6.8\").    Weight as of this encounter: 85 kg (187 lb 6.4 oz). .      "

## 2019-08-13 NOTE — NURSING NOTE
"Chief Complaint   Patient presents with     Medicare Visit       Initial BP (!) 172/90   Pulse 69   Temp 98  F (36.7  C) (Tympanic)   Resp 14   Ht 1.697 m (5' 6.8\")   Wt 85 kg (187 lb 6.4 oz)   SpO2 97%   Breastfeeding? No   BMI 29.53 kg/m   Estimated body mass index is 29.53 kg/m  as calculated from the following:    Height as of this encounter: 1.697 m (5' 6.8\").    Weight as of this encounter: 85 kg (187 lb 6.4 oz).    Medication Reconciliation:  unable or not appropriate to perform    Judie Rothman CMA ([facility])    Screening Questionnaire for Adult Immunization    Are you sick today?   No   Do you have allergies to medications, food, a vaccine component or latex?   No   Have you ever had a serious reaction after receiving a vaccination?   No   Do you have a long-term health problem with heart disease, lung disease, asthma, kidney disease, metabolic disease (e.g. diabetes), anemia, or other blood disorder?   No   Do you have cancer, leukemia, HIV/AIDS, or any other immune system problem?   No   In the past 3 months, have you taken medications that affect  your immune system, such as prednisone, other steroids, or anticancer drugs; drugs for the treatment of rheumatoid arthritis, Crohn s disease, or psoriasis; or have you had radiation treatments?   No   Have you had a seizure, or a brain or other nervous system problem?   No   During the past year, have you received a transfusion of blood or blood     products, or been given immune (gamma) globulin or antiviral drug?   No   For women: Are you pregnant or is there a chance you could become        pregnant during the next month?   No   Have you received any vaccinations in the past 4 weeks?   No     Immunization questionnaire answers were all negative.        Per orders of Dr. Genao, injection of TDAP given by Judie Rothman. Patient instructed to remain in clinic for 15 minutes afterwards, and to report any adverse reaction to me immediately.       Screening " performed by Judie Rothman on 8/13/2019 at 2:47 PM.

## 2019-08-13 NOTE — NURSING NOTE
"Chief Complaint   Patient presents with     Medicare Visit       Initial BP (!) 172/90   Pulse 69   Temp 98  F (36.7  C) (Tympanic)   Resp 14   Ht 1.697 m (5' 6.8\")   Wt 85 kg (187 lb 6.4 oz)   SpO2 97%   Breastfeeding? No   BMI 29.53 kg/m   Estimated body mass index is 29.53 kg/m  as calculated from the following:    Height as of this encounter: 1.697 m (5' 6.8\").    Weight as of this encounter: 85 kg (187 lb 6.4 oz).    Medication Reconciliation:  unable or not appropriate to perform    Judie Rothman CMA ([facility])    Screening Questionnaire for Adult Immunization    Are you sick today?   No   Do you have allergies to medications, food, a vaccine component or latex?   No   Have you ever had a serious reaction after receiving a vaccination?   No   Do you have a long-term health problem with heart disease, lung disease, asthma, kidney disease, metabolic disease (e.g. diabetes), anemia, or other blood disorder?   No   Do you have cancer, leukemia, HIV/AIDS, or any other immune system problem?   No   In the past 3 months, have you taken medications that affect  your immune system, such as prednisone, other steroids, or anticancer drugs; drugs for the treatment of rheumatoid arthritis, Crohn s disease, or psoriasis; or have you had radiation treatments?   No   Have you had a seizure, or a brain or other nervous system problem?   No   During the past year, have you received a transfusion of blood or blood     products, or been given immune (gamma) globulin or antiviral drug?   No   For women: Are you pregnant or is there a chance you could become        pregnant during the next month?   No   Have you received any vaccinations in the past 4 weeks?   No     Immunization questionnaire answers were all negative.        Per orders of Dr. Genao, injection of TDAP given by Judie Rothman. Patient instructed to remain in clinic for 15 minutes afterwards, and to report any adverse reaction to me immediately.       Screening " performed by Judie Rothman on 8/13/2019 at 2:23 PM.

## 2019-08-20 ENCOUNTER — ANCILLARY PROCEDURE (OUTPATIENT)
Dept: MRI IMAGING | Facility: CLINIC | Age: 84
End: 2019-08-20
Attending: FAMILY MEDICINE
Payer: COMMERCIAL

## 2019-08-20 ENCOUNTER — ALLIED HEALTH/NURSE VISIT (OUTPATIENT)
Dept: FAMILY MEDICINE | Facility: CLINIC | Age: 84
End: 2019-08-20

## 2019-08-20 VITALS — HEART RATE: 72 BPM | DIASTOLIC BLOOD PRESSURE: 90 MMHG | SYSTOLIC BLOOD PRESSURE: 168 MMHG

## 2019-08-20 DIAGNOSIS — M54.31 RIGHT SIDED SCIATICA: ICD-10-CM

## 2019-08-20 DIAGNOSIS — I10 BENIGN ESSENTIAL HYPERTENSION: Primary | ICD-10-CM

## 2019-08-20 PROCEDURE — 72148 MRI LUMBAR SPINE W/O DYE: CPT | Mod: TC

## 2019-08-20 PROCEDURE — 99207 ZZC NO CHARGE NURSE ONLY: CPT | Performed by: FAMILY MEDICINE

## 2019-08-20 NOTE — PROGRESS NOTES
Sena Flores was evaluated at [facility] on August 20, 2019 at which time her blood pressure was:    BP Readings from Last 3 Encounters:   08/20/19 (!) 168/90   08/13/19 (!) 172/90   10/24/18 124/72     Pulse Readings from Last 3 Encounters:   08/20/19 72   08/13/19 69   10/24/18 87       Reviewed lifestyle modifications for blood pressure control and reduction: including making healthy food choices, managing weight, getting regular exercise, smoking cessation, reducing alcohol consumption, monitoring blood pressure regularly.     Symptoms: None    BP Goal:< 140/90 mmHg    BP Assessment:  BP too high    Potential Reasons for BP too high: Maybe anxiety/stress, pt just had MRI before BP check    BP Follow-Up Plan: Recheck BP in 30 days at pharmacy    Recommendation to Provider: Add another hypertension agent to regimen    Note completed by: Porter Hobbs, Pharm. D

## 2019-08-20 NOTE — PROGRESS NOTES
BP uncontrolled.  Increase lisinopril 5 mg to 3 tablets once a day then RN recheck in 1-2 weeks.  Reinforce sodium restrictions.

## 2019-08-21 ENCOUNTER — TELEPHONE (OUTPATIENT)
Dept: FAMILY MEDICINE | Facility: CLINIC | Age: 84
End: 2019-08-21

## 2019-08-21 DIAGNOSIS — I10 UNCONTROLLED HYPERTENSION: ICD-10-CM

## 2019-08-21 RX ORDER — LISINOPRIL 5 MG/1
TABLET ORAL
Qty: 90 TABLET | Refills: 0 | Status: SHIPPED | OUTPATIENT
Start: 2019-08-21 | End: 2019-10-22

## 2019-08-21 NOTE — TELEPHONE ENCOUNTER
Author: Neno Genao MD Service: -- Author Type: Physician   Filed: 8/20/2019  4:09 PM Encounter Date: 8/20/2019 Status: Signed   : Neno Genao MD (Physician)        []Hide copied text    []Hover for details  BP uncontrolled.  Increase lisinopril 5 mg to 3 tablets once a day then RN recheck in 1-2 weeks.  Reinforce sodium restrictions.

## 2019-08-27 ENCOUNTER — OFFICE VISIT (OUTPATIENT)
Dept: UROLOGY | Facility: CLINIC | Age: 84
End: 2019-08-27
Payer: COMMERCIAL

## 2019-08-27 VITALS — SYSTOLIC BLOOD PRESSURE: 180 MMHG | DIASTOLIC BLOOD PRESSURE: 96 MMHG | HEART RATE: 68 BPM | OXYGEN SATURATION: 95 %

## 2019-08-27 DIAGNOSIS — I10 BENIGN ESSENTIAL HYPERTENSION: ICD-10-CM

## 2019-08-27 DIAGNOSIS — N32.81 OAB (OVERACTIVE BLADDER): Primary | ICD-10-CM

## 2019-08-27 DIAGNOSIS — E78.5 DYSLIPIDEMIA (HIGH LDL; LOW HDL): ICD-10-CM

## 2019-08-27 LAB
CHOLEST SERPL-MCNC: 273 MG/DL
HDLC SERPL-MCNC: 82 MG/DL
LDLC SERPL CALC-MCNC: 178 MG/DL
NONHDLC SERPL-MCNC: 191 MG/DL
TRIGL SERPL-MCNC: 64 MG/DL

## 2019-08-27 PROCEDURE — 99203 OFFICE O/P NEW LOW 30 MIN: CPT | Mod: 25 | Performed by: UROLOGY

## 2019-08-27 PROCEDURE — 80061 LIPID PANEL: CPT | Performed by: FAMILY MEDICINE

## 2019-08-27 PROCEDURE — 36415 COLL VENOUS BLD VENIPUNCTURE: CPT | Performed by: FAMILY MEDICINE

## 2019-08-27 PROCEDURE — 51798 US URINE CAPACITY MEASURE: CPT | Performed by: UROLOGY

## 2019-08-27 NOTE — PROGRESS NOTES
CC:    HPI:  Sena Flores is a 84 year old female asked to be seen in consultation by Dr. Genao for urinary urgency and incontinence.  This problem has been going on for many years and has been getting worse.  It is associated with mainly urgency incontinence.  She has several episodes of incontinence per day and has been using several pads per day.  She has  had no previous treatment for her condition and has tried Kegel exercises in the past which have not significantly helped.  The patient voids q2 hours, nocturia X 4-5.  She drinks normally.  She denies any dysuria, nocturia, hematuria, pyuria, hesitency, intermittency, feeling of incomplete emptying, or any recent hx of UTI's or stones.    The patient has no constipation or splinting.  She is sexually  active and denies any dyspareunia or pelvic pain.   She denies any vaginal bulge. She has no Neurological or balance problems     Obstetric Hx:  She is . Babies were deliever by vaginally.    Current Outpatient Medications   Medication Sig Dispense Refill     lisinopril (PRINIVIL/ZESTRIL) 5 MG tablet Take 3 tablets or (15 mg) daily 90 tablet 0     acetaminophen (TYLENOL) 325 MG tablet Take 1-2 tablets (325-650 mg) by mouth every 6 hours as needed for mild pain (Patient not taking: Reported on 2019) 100 tablet 0     Allergies   Allergen Reactions     Penicillins      Past Medical History:   Diagnosis Date     Arthritis      Hypertension      Past Surgical History:   Procedure Laterality Date     APPENDECTOMY       COLONOSCOPY        Family History   Problem Relation Age of Onset     Hypertension Mother      Coronary Artery Disease Mother      Colon Cancer Brother      Other Cancer Brother      Cerebrovascular Disease Maternal Grandmother      Coronary Artery Disease Maternal Grandfather      Alzheimer Disease Paternal Grandmother      Thyroid Disease Sister      Other Cancer Brother         lung     Rheumatoid Arthritis Sister      Social History      Socioeconomic History     Marital status:      Spouse name: None     Number of children: None     Years of education: None     Highest education level: None   Occupational History     None   Social Needs     Financial resource strain: None     Food insecurity:     Worry: None     Inability: None     Transportation needs:     Medical: None     Non-medical: None   Tobacco Use     Smoking status: Former Smoker     Types: Cigarettes     Smokeless tobacco: Never Used   Substance and Sexual Activity     Alcohol use: No     Drug use: No     Sexual activity: None   Lifestyle     Physical activity:     Days per week: None     Minutes per session: None     Stress: None   Relationships     Social connections:     Talks on phone: None     Gets together: None     Attends Hinduism service: None     Active member of club or organization: None     Attends meetings of clubs or organizations: None     Relationship status: None     Intimate partner violence:     Fear of current or ex partner: None     Emotionally abused: None     Physically abused: None     Forced sexual activity: None   Other Topics Concern     Parent/sibling w/ CABG, MI or angioplasty before 65F 55M? No   Social History Narrative     None       REVIEW OF SYSTEMS  =================  C: NEGATIVE for fever, chills, change in weight  I: NEGATIVE for worrisome rashes, moles or lesions  E/M: NEGATIVE for ear, mouth and throat problems  R: NEGATIVE for significant cough or SHORTNESS OF BREATH  CV:  NEGATIVE for chest pain, palpitations or peripheral edema  GI: NEGATIVE for nausea, abdominal pain, heartburn, or change in bowel habits  NEURO: NEGATIVE numbness/weakness  : see HPI  PSYCH: NEGATIVE depression/anxiety  LYmph: no new enlarged lymph nodes  Ortho: no new trauma/movements      Physical Exam:  BP (!) 180/96 (BP Location: Right arm, Patient Position: Chair, Cuff Size: Adult Large)   Pulse 68   SpO2 95%    Patient is pleasant, in no acute distress,  good general condition.  HEENT:  Normalcephalic, atraumatic  Lung: no evidence of respiratory distress    Abdomen: Soft, nondistended, non tender. No masses. No rebound or guarding.  :  Normal external genitalia and introitus,  atrophic changes          Urethral hypermobility mild          Stress incontinence not demonstrated with coughing          No cystocele or rectocele          [place]  is normal and well supported          Pelvic floor muscles of normal tonicity, non tender  Skin: Warm and dry.  No redness.  Neuro: grossly normal  Psych normal mood and affect  Musculoskeletal  moving all extremities    RU: 0 cc            Office Visit on 08/13/2019   Component Date Value Ref Range Status     Sodium 08/13/2019 136  133 - 144 mmol/L Final     Potassium 08/13/2019 3.7  3.4 - 5.3 mmol/L Final     Chloride 08/13/2019 101  94 - 109 mmol/L Final     Carbon Dioxide 08/13/2019 29  20 - 32 mmol/L Final     Anion Gap 08/13/2019 6  3 - 14 mmol/L Final     Glucose 08/13/2019 87  70 - 99 mg/dL Final     Urea Nitrogen 08/13/2019 19  7 - 30 mg/dL Final     Creatinine 08/13/2019 0.82  0.52 - 1.04 mg/dL Final     GFR Estimate 08/13/2019 66  >60 mL/min/[1.73_m2] Final    Comment: Non  GFR Calc  Starting 12/18/2018, serum creatinine based estimated GFR (eGFR) will be   calculated using the Chronic Kidney Disease Epidemiology Collaboration   (CKD-EPI) equation.       GFR Estimate If Black 08/13/2019 76  >60 mL/min/[1.73_m2] Final    Comment:  GFR Calc  Starting 12/18/2018, serum creatinine based estimated GFR (eGFR) will be   calculated using the Chronic Kidney Disease Epidemiology Collaboration   (CKD-EPI) equation.       Calcium 08/13/2019 9.0  8.5 - 10.1 mg/dL Final     LDL Cholesterol Direct 08/13/2019 174* <100 mg/dL Final    Comment: Above desirable:  100-129 mg/dl  Borderline High:  130-159 mg/dL  High:             160-189 mg/dL  Very high:       >189 mg/dl       Color Urine 08/13/2019 Yellow    Final     Appearance Urine 08/13/2019 Clear   Final     Glucose Urine 08/13/2019 Negative  NEG^Negative mg/dL Final     Bilirubin Urine 08/13/2019 Negative  NEG^Negative Final     Ketones Urine 08/13/2019 Negative  NEG^Negative mg/dL Final     Specific Gravity Urine 08/13/2019 <=1.005  1.003 - 1.035 Final     pH Urine 08/13/2019 5.5  5.0 - 7.0 pH Final     Protein Albumin Urine 08/13/2019 Negative  NEG^Negative mg/dL Final     Urobilinogen Urine 08/13/2019 0.2  0.2 - 1.0 EU/dL Final     Nitrite Urine 08/13/2019 Negative  NEG^Negative Final     Blood Urine 08/13/2019 Trace* NEG^Negative Final     Leukocyte Esterase Urine 08/13/2019 Small* NEG^Negative Final     Source 08/13/2019 Midstream Urine   Final     WBC Urine 08/13/2019 0 - 5  OTO5^0 - 5 /HPF Final     RBC Urine 08/13/2019 O - 2  OTO2^O - 2 /HPF Final     Squamous Epithelial /LPF Urine 08/13/2019 Few  FEW^Few /LPF Final       IMAGING:    ASSESSMENT and PLAN:  This is a 84 year old female with OAB with urgency and urgency incontinence.    Different management options were discussed with the patient including observation, Kegel exercises, biofeedback, PT, medication, PTNS, Botox, and Interstim.    She is not interested in treatments at this time and will come back to see me if symptoms are more bothersome.    Thank you for allowing me to participate in Ms. Flores's care.  I will keep you updated on her progress.      HTN: Sena to follow up with Primary Care provider regarding elevated blood pressure.  Emigdio Osei MD, MD

## 2019-08-28 PROBLEM — E78.5 DYSLIPIDEMIA (HIGH LDL; LOW HDL): Status: ACTIVE | Noted: 2019-08-28

## 2019-09-08 ENCOUNTER — TELEPHONE (OUTPATIENT)
Dept: FAMILY MEDICINE | Facility: CLINIC | Age: 84
End: 2019-09-08

## 2019-09-08 DIAGNOSIS — I10 BENIGN ESSENTIAL HYPERTENSION: ICD-10-CM

## 2019-09-09 NOTE — TELEPHONE ENCOUNTER
"Requested Prescriptions   Pending Prescriptions Disp Refills     lisinopril (PRINIVIL/ZESTRIL) 5 MG tablet [Pharmacy Med Name: LISINOPRIL 5 MG TABLET] 30 tablet 0     Sig: TAKE 1 TABLET BY MOUTH EVERY DAY   Last Written Prescription Date:  8/21/19  Last Fill Quantity: 90 tab,  # refills: 0   Last office visit: 8/13/2019 with prescribing provider:  Neno Genao     Future Office Visit:        ACE Inhibitors (Including Combos) Protocol Failed - 9/8/2019  4:23 AM        Failed - Blood pressure under 140/90 in past 12 months     BP Readings from Last 3 Encounters:   08/27/19 (!) 180/96   08/20/19 (!) 168/90   08/13/19 (!) 172/90                 Passed - Recent (12 mo) or future (30 days) visit within the authorizing provider's specialty     Patient had office visit in the last 12 months or has a visit in the next 30 days with authorizing provider or within the authorizing provider's specialty.  See \"Patient Info\" tab in inbasket, or \"Choose Columns\" in Meds & Orders section of the refill encounter.              Passed - Medication is active on med list        Passed - Patient is age 18 or older        Passed - No active pregnancy on record        Passed - Normal serum creatinine on file in past 12 months     Recent Labs   Lab Test 08/13/19  1418   CR 0.82             Passed - Normal serum potassium on file in past 12 months     Recent Labs   Lab Test 08/13/19  1418   POTASSIUM 3.7             Passed - No positive pregnancy test within past 12 months          "

## 2019-09-12 NOTE — TELEPHONE ENCOUNTER
Left message for patient to return call to clinic.  She is due for bp check - lisinopril increased 8-21-19  Please help her schedule this appt.  Sylvia SLOAN RN

## 2019-09-13 ENCOUNTER — ALLIED HEALTH/NURSE VISIT (OUTPATIENT)
Dept: NURSING | Facility: CLINIC | Age: 84
End: 2019-09-13
Payer: COMMERCIAL

## 2019-09-13 VITALS — HEART RATE: 76 BPM | SYSTOLIC BLOOD PRESSURE: 148 MMHG | DIASTOLIC BLOOD PRESSURE: 86 MMHG

## 2019-09-13 DIAGNOSIS — I10 BENIGN ESSENTIAL HYPERTENSION: Primary | ICD-10-CM

## 2019-09-13 PROCEDURE — 99207 ZZC NO CHARGE NURSE ONLY: CPT

## 2019-09-13 RX ORDER — LISINOPRIL 5 MG/1
15 TABLET ORAL DAILY
Qty: 90 TABLET | Refills: 0 | Status: SHIPPED | OUTPATIENT
Start: 2019-09-13 | End: 2019-10-22

## 2019-09-13 NOTE — TELEPHONE ENCOUNTER
Pt returned for RN blood pressure check today.    She this encounter for further instructions.      Pt notified of Dr Genao's recommendations.      Ariadna Shah RN

## 2019-09-13 NOTE — NURSING NOTE
Pt advised of Dr Genao's notes.  She intends to follow up with Dr Genao in 1-2 weeks.    Ariadna Shah RN

## 2019-09-13 NOTE — PROGRESS NOTES
Sena Flores is a 84 year old patient who comes in today for a Blood Pressure check.    Patient opted to sit quietly in an exam room for 5 minutes prior to manual BP reading  BP (!) 148/86   Pulse 76        Patient states that she checks her BP's at home and they are much lower.  She will bring in home BP unit to next appointment for comparison.  I also encouraged her to document her home readings and to bring them in to share with her primary provider in [place].    Disposition: results routed to provider    Lakia Morel, [facility]

## 2019-09-13 NOTE — TELEPHONE ENCOUNTER
Per 8/21/19 telephone encounter:  Yudelka Robertson RN 8/21/19 8:43 AM   Note      Patient is contacted and told of the increase in her lisinopril to 15 mg daily.  Patient states she need a refill then.  I have sent this off with the new directions for the patient.  She will have a bp jodi in 1-2 wks. Yudelka MIDDLETON RN          Patient had it taken at [facility] but did not take her BP medication that day. Patient reports she has lower BPs now at home.   Patient had it checked at today BP: 123/85 on home machine.     Advised patient to go et BP checked today by an RN. Patient reports she will go get BP checked.     Pended Lisinopril 15 mg daily.     Will leave open for BP and route to provider once completed.

## 2019-09-13 NOTE — PROGRESS NOTES
Recheck BP in clinic on next appointment, and bring home BP machine for comparison if accurate.  If there is discrepancy of clinic and home measurements, patient may benefit from continuous home BP monitoring for more accuracy.  Her BP on this recheck is slightly high still, but better than previous.  Reinforce sodium restrictions.  Keep lisinopril at 5 mg daily for now.

## 2019-10-04 ENCOUNTER — TRANSFERRED RECORDS (OUTPATIENT)
Dept: HEALTH INFORMATION MANAGEMENT | Facility: CLINIC | Age: 84
End: 2019-10-04

## 2019-10-08 ENCOUNTER — ALLIED HEALTH/NURSE VISIT (OUTPATIENT)
Dept: FAMILY MEDICINE | Facility: CLINIC | Age: 84
End: 2019-10-08
Payer: COMMERCIAL

## 2019-10-08 VITALS — DIASTOLIC BLOOD PRESSURE: 82 MMHG | SYSTOLIC BLOOD PRESSURE: 138 MMHG

## 2019-10-08 DIAGNOSIS — I10 BENIGN ESSENTIAL HYPERTENSION: Primary | ICD-10-CM

## 2019-10-08 PROCEDURE — 99207 ZZC NO CHARGE NURSE ONLY: CPT | Performed by: FAMILY MEDICINE

## 2019-10-08 NOTE — Clinical Note
Routing message to PCP for review -BP checked at pharmacy and patient requested provider review results.

## 2019-10-21 ENCOUNTER — TELEPHONE (OUTPATIENT)
Dept: FAMILY MEDICINE | Facility: CLINIC | Age: 84
End: 2019-10-21

## 2019-10-21 DIAGNOSIS — I10 BENIGN ESSENTIAL HYPERTENSION: ICD-10-CM

## 2019-10-21 NOTE — TELEPHONE ENCOUNTER
"Requested Prescriptions   Pending Prescriptions Disp Refills     lisinopril (PRINIVIL/ZESTRIL) 5 MG tablet [Pharmacy Med Name: LISINOPRIL 5 MG TABLET] 90 tablet 0     Sig: TAKE 3 TABLETS BY MOUTH EVERY DAY   Last Written Prescription Date:  9/13/19  Last Fill Quantity: 90 tab,  # refills: 0  Last office visit: 8/13/2019 with prescribing provider:  Neno Genao     Future Office Visit:        ACE Inhibitors (Including Combos) Protocol Passed - 10/21/2019  1:38 AM        Passed - Blood pressure under 140/90 in past 12 months     BP Readings from Last 3 Encounters:   10/08/19 138/82   09/13/19 (!) 148/86   08/27/19 (!) 180/96                 Passed - Recent (12 mo) or future (30 days) visit within the authorizing provider's specialty     Patient has had an office visit with the authorizing provider or a provider within the authorizing providers department within the previous 12 mos or has a future within next 30 days. See \"Patient Info\" tab in inbasket, or \"Choose Columns\" in Meds & Orders section of the refill encounter.              Passed - Medication is active on med list        Passed - Patient is age 18 or older        Passed - No active pregnancy on record        Passed - Normal serum creatinine on file in past 12 months     Recent Labs   Lab Test 08/13/19  1418   CR 0.82             Passed - Normal serum potassium on file in past 12 months     Recent Labs   Lab Test 08/13/19  1418   POTASSIUM 3.7             Passed - No positive pregnancy test within past 12 months          "

## 2019-10-22 ENCOUNTER — OFFICE VISIT (OUTPATIENT)
Dept: FAMILY MEDICINE | Facility: CLINIC | Age: 84
End: 2019-10-22
Payer: COMMERCIAL

## 2019-10-22 VITALS
RESPIRATION RATE: 14 BRPM | SYSTOLIC BLOOD PRESSURE: 132 MMHG | BODY MASS INDEX: 28.34 KG/M2 | DIASTOLIC BLOOD PRESSURE: 88 MMHG | WEIGHT: 187 LBS | HEIGHT: 68 IN | HEART RATE: 72 BPM | OXYGEN SATURATION: 97 % | TEMPERATURE: 98.3 F

## 2019-10-22 DIAGNOSIS — I10 BENIGN ESSENTIAL HYPERTENSION: Primary | ICD-10-CM

## 2019-10-22 DIAGNOSIS — E03.9 HYPOTHYROIDISM, UNSPECIFIED TYPE: ICD-10-CM

## 2019-10-22 PROCEDURE — 99214 OFFICE O/P EST MOD 30 MIN: CPT | Performed by: FAMILY MEDICINE

## 2019-10-22 RX ORDER — LISINOPRIL 5 MG/1
TABLET ORAL
Qty: 90 TABLET | Refills: 0 | OUTPATIENT
Start: 2019-10-22

## 2019-10-22 RX ORDER — LISINOPRIL 5 MG/1
10 TABLET ORAL DAILY
Qty: 180 TABLET | Refills: 3 | Status: SHIPPED | OUTPATIENT
Start: 2019-10-22 | End: 2021-10-07

## 2019-10-22 ASSESSMENT — MIFFLIN-ST. JEOR: SCORE: 1351.73

## 2019-10-22 NOTE — PATIENT INSTRUCTIONS
Decrease lisinopril 5 mg too 2 tablets a day.  Recheck your blood pressure with a provider in [place] in 1-2 weeks.  Low salt diet.    You declined thyroid replacement therapy.  You may see an endocrinologist if you desire for this. You may let care team know when you decide to do so.  You deferred thyroid ultrasound. This may be ordered by your provider in [place].      Patient Education     Hypothyroidism    You have hypothyroidism. This means your thyroid gland is not making enough thyroid hormone. This hormone is vital to body growth and metabolism. If you don t make enough, many body processes slow down. This can cause symptoms throughout the body. Hypothyroidism can range from mild to severe. The most severe form is called myxedema.  There are a number of causes of hypothyroidism. A common cause is Hashimoto s disease. This disease causes the body s own immune system to attack the thyroid gland. When you have certain treatments, such as surgery to remove the thyroid gland, this can also cause hypothyroidism. Sometimes the thyroid gland is not functioning because of lack of stimulation from the pituitary gland.  Symptoms of hypothyroidism can include:    Fatigue    Trouble concentrating or thinking clearly; forgetfulness    Dry skin    Hair loss    Weight gain    Low tolerance to cold    Constipation    Depression    Personality changes    Tingling or prickling of the hands or feet    Heavy, absent, or irregular periods (women only)  Older adults may sometimes have other symptoms. These can include:    Muscle aches and weakness    Confusion    Incontinence (unable to control urine or stool)    Trouble moving around    Falling  Treatment for hypothyroidism involves taking thyroid hormone pills daily. These pills replace the hormone your thyroid doesn t make. You will likely need to take a daily pill for the rest of your life. Tips for taking this medicine are given below.  Home care  Tips for taking your  medicine    Take your thyroid hormone pills as prescribed by your healthcare provider. This is most often 1 pill a day on an empty stomach. Use a pillbox labeled with the days of the week. This will help you remember to take your pill each day.    Don t take products that contain iron and calcium or antacids within 4 hours of taking your thyroid hormone pills.    Don t take other medicines with your thyroid hormone pill without checking with your provider first.    Tell your provider if you have any side effects from your medicines that bother you, especially any chest pain or irregular heartbeats.    Never change the dosage or stop taking your thyroid pills without talking to your provider first.  General care    Always talk with your provider before trying other medicines or treatments for your thyroid problem.    If you see other healthcare providers, be sure to let them know about your thyroid problem.    Let your healthcare provider know if you become pregnant because your dose of thyroid hormone will need to be adjusted.  Follow-up care  See your healthcare provider for checkups as advised. You may need regular tests to check the level of thyroid hormone in your blood.  When to seek medical advice  Call your healthcare provider right away if any of these occur:    New symptoms develop    Symptoms return, continue, or worsen even after treatment    Extreme fatigue    Puffy hands, face, or feet    Fast or irregular heartbeat    Confusion  Call 911  Call 911 if any of these occur:    Fainting    Chest pain    Shortness of breath or trouble breathing  Date Last Reviewed: 4/1/2018 2000-2018 [employer]. [address] [place] [address], [place] [address]. All rights reserved. This information is not intended as a substitute for professional medical care. Always follow your healthcare professional's instructions.

## 2019-10-22 NOTE — PROGRESS NOTES
"Subjective     Sena Flores is a 84 year old female who presents to clinic today for the following health issues:  Chief Complaint   Patient presents with     Results     Pt here to discuss life line screening results from 10/4/19.     Medication Problem     Pt also having side effects from lisinopril, would like to discuss.     Flu Shot     declined     Pt brought copy of life line screening with her today.    HPI   Medication Followup of lisinopril    Taking Medication as prescribed: yes    Side Effects:  Cough, dizziness, phlegm in throat    Medication Helping Symptoms:  Yes    Currently at 3 tablets (15 mg total) per day.     Patient points out abnormal labs in her [facility] Screening: TSH >10, carotid ultrasound reported \"abnormality in thyroid gland\", eGFR 57.  No FT4 level.  No specifics on \"thyroid abnormality\".    Patient is known to have hypothyroidism for a long time with last year's [employer] [facility] results showing low T3 and T4.  Patient has refused to ttake levothyroxine due to fear that is poison to her.  She has preferred to take [facility] thyroid.  She declines to see endo at this time as they are leaving for [place] in 5 days.      Patient Active Problem List   Diagnosis     Vaccine refused by patient     Problem with medical care compliance     Uncontrolled hypertension     Benign essential hypertension     Dyslipidemia (high LDL; low HDL)     Past Surgical History:   Procedure Laterality Date     APPENDECTOMY       COLONOSCOPY         Social History     Tobacco Use     Smoking status: Former Smoker     Types: Cigarettes     Smokeless tobacco: Never Used   Substance Use Topics     Alcohol use: No     Family History   Problem Relation Age of Onset     Hypertension Mother      Coronary Artery Disease Mother      Colon Cancer Brother      Other Cancer Brother      Cerebrovascular Disease Maternal Grandmother      Coronary Artery Disease Maternal Grandfather      Alzheimer Disease Paternal Grandmother  " "    Thyroid Disease Sister      Other Cancer Brother         lung     Rheumatoid Arthritis Sister          Current Outpatient Medications   Medication Sig Dispense Refill     lisinopril (PRINIVIL/ZESTRIL) 5 MG tablet Take 3 tablets (15 mg) by mouth daily 90 tablet 0     acetaminophen (TYLENOL) 325 MG tablet Take 1-2 tablets (325-650 mg) by mouth every 6 hours as needed for mild pain (Patient not taking: Reported on 8/27/2019) 100 tablet 0     Allergies   Allergen Reactions     Penicillins          Reviewed and updated as needed this visit by Provider         Review of Systems   ROS COMP: Constitutional, HEENT, cardiovascular, pulmonary, GI, , musculoskeletal, neuro, skin, endocrine and psych systems are negative, except as otherwise noted.      Objective    /88   Pulse 72   Temp 98.3  F (36.8  C) (Tympanic)   Resp 14   Ht 1.727 m (5' 8\")   Wt 84.8 kg (187 lb)   SpO2 97%   BMI 28.43 kg/m    Body mass index is 28.43 kg/m .  Physical Exam   GEN: alert, oriented x 3, NAD  SKIN: no jaundice/rash  PSYCH; normal mood, appropriate affect, linear thought process, poor insight and judgement on her health needs.    Diagnostic Test Results:  Labs reviewed in Epic  Results for orders placed or performed in visit on 08/27/19   Lipid panel reflex to direct LDL Fasting   Result Value Ref Range    Cholesterol 273 (H) <200 mg/dL    Triglycerides 64 <150 mg/dL    HDL Cholesterol 82 >49 mg/dL    LDL Cholesterol Calculated 178 (H) <100 mg/dL    Non HDL Cholesterol 191 (H) <130 mg/dL           Assessment & Plan     Sena was seen today for results, medication problem and flu shot.    Diagnoses and all orders for this visit:    Benign essential hypertension  -     lisinopril (PRINIVIL/ZESTRIL) 5 MG tablet; Take 2 tablets (10 mg) by mouth daily  Patient insists on decreasing dose of lisinopril due to occasional dizziness and cough.  She understands previous HBP at 10 mg daily.   Discussed option to try different med - she " declined.  Will allow reduction of the med as above - close follow up with a provider in [place] in 2 weeks. Patient concurred.  Sodium restriction reinforced.  Reasons to go to ER discussed in detail with patient.    Hypothyroidism, unspecified type  Patient was again advised on risks of untreated condition.  Advised current guidelines and recommednation in treatment.  Discussed risks of [place] thyroid; patient still declines taking levothyroxine.  Offered referral to endo - she defers.  She said she will try to see an endo in [place].  Reasons to go to ER discussed in detail with patient.           Patient Instructions   Decrease lisinopril 5 mg too 2 tablets a day.  Recheck your blood pressure with a provider in [place] in 1-2 weeks.  Low salt diet.    You declined thyroid replacement therapy.  You may see an endocrinologist if you desire for this. You may let care team know when you decide to do so.  You deferred thyroid ultrasound. This may be ordered by your provider in [place].      Patient Education     Hypothyroidism    You have hypothyroidism. This means your thyroid gland is not making enough thyroid hormone. This hormone is vital to body growth and metabolism. If you don t make enough, many body processes slow down. This can cause symptoms throughout the body. Hypothyroidism can range from mild to severe. The most severe form is called myxedema.  There are a number of causes of hypothyroidism. A common cause is Hashimoto s disease. This disease causes the body s own immune system to attack the thyroid gland. When you have certain treatments, such as surgery to remove the thyroid gland, this can also cause hypothyroidism. Sometimes the thyroid gland is not functioning because of lack of stimulation from the pituitary gland.  Symptoms of hypothyroidism can include:    Fatigue    Trouble concentrating or thinking clearly; forgetfulness    Dry skin    Hair loss    Weight gain    Low tolerance to  cold    Constipation    Depression    Personality changes    Tingling or prickling of the hands or feet    Heavy, absent, or irregular periods (women only)  Older adults may sometimes have other symptoms. These can include:    Muscle aches and weakness    Confusion    Incontinence (unable to control urine or stool)    Trouble moving around    Falling  Treatment for hypothyroidism involves taking thyroid hormone pills daily. These pills replace the hormone your thyroid doesn t make. You will likely need to take a daily pill for the rest of your life. Tips for taking this medicine are given below.  Home care  Tips for taking your medicine    Take your thyroid hormone pills as prescribed by your healthcare provider. This is most often 1 pill a day on an empty stomach. Use a pillbox labeled with the days of the week. This will help you remember to take your pill each day.    Don t take products that contain iron and calcium or antacids within 4 hours of taking your thyroid hormone pills.    Don t take other medicines with your thyroid hormone pill without checking with your provider first.    Tell your provider if you have any side effects from your medicines that bother you, especially any chest pain or irregular heartbeats.    Never change the dosage or stop taking your thyroid pills without talking to your provider first.  General care    Always talk with your provider before trying other medicines or treatments for your thyroid problem.    If you see other healthcare providers, be sure to let them know about your thyroid problem.    Let your healthcare provider know if you become pregnant because your dose of thyroid hormone will need to be adjusted.  Follow-up care  See your healthcare provider for checkups as advised. You may need regular tests to check the level of thyroid hormone in your blood.  When to seek medical advice  Call your healthcare provider right away if any of these occur:    New symptoms  develop    Symptoms return, continue, or worsen even after treatment    Extreme fatigue    Puffy hands, face, or feet    Fast or irregular heartbeat    Confusion  Call 911  Call 911 if any of these occur:    Fainting    Chest pain    Shortness of breath or trouble breathing  Date Last Reviewed: 4/1/2018 2000-2018 [employer]. [address] [place] [id number]. All rights reserved. This information is not intended as a substitute for professional medical care. Always follow your healthcare professional's instructions.               Return in about 2 weeks (around 11/5/2019) for BP Recheck with your provider in [place].    Neno Genao MD  [facility]

## 2019-10-31 PROBLEM — E03.9 HYPOTHYROIDISM, UNSPECIFIED TYPE: Status: ACTIVE | Noted: 2019-10-31

## 2019-12-16 ENCOUNTER — TELEPHONE (OUTPATIENT)
Dept: FAMILY MEDICINE | Facility: CLINIC | Age: 84
End: 2019-12-16

## 2019-12-16 NOTE — TELEPHONE ENCOUNTER
Reason for call:  Patient reporting a symptom    Symptom or request: Patient has high blood pressure. Patient was on Lisinopril, went off because she was worried about her kidneys, has been off Lisinopril for 4 days. Now has issues with urine dribbling out, not being able to empty bladder completely, before and after she went on Lisinopril. Wants new blood pressure medication.Patient has been in [place] 2 months.  Duration (how long have symptoms been present): Several months    Have you been treated for this before? Yes-for high blood pressure    Additional comments: Patient will be in [place] until May.    Phone Number patient can be reached at:  Home number on file [phone] (home)    Best Time:  Any    Can we leave a detailed message on this number:  YES    Call taken on 12/16/2019 at 10:46 AM by Destini [facility]

## 2019-12-16 NOTE — TELEPHONE ENCOUNTER
"Advised patient to be seen in [place] for blood pressure. Patient states \"it is not as easy as you think\" but agrees to going to provider while in [place].      SERENA ElyN, RN    "

## 2020-11-16 NOTE — TELEPHONE ENCOUNTER
COVID-19 SCREENING:    Does patient OR patientâs household members have the following:    â¢ Fever >100.0Â°F or >37.8Â°C or Chills?    no  â¢ New or worsening cough, shortness of breath, or difficulty breathing?   no   â¢ Sore Throat?  no  â¢ Congestion or runny nose?  no  â¢ New onset of nausea, vomiting or diarrhea?   no  â¢ New onset of loss of taste or smell?  no  â¢ Muscle or Body aches?  no  â¢ Headache?  no  Has patient or a household member tested positive for COVID-19 in the last 14 days?   no  Has patient or a household member been tested for COVID-19 and are awaiting the results?   no    Patient has been screened for COVID-19, per [facility] Algorithm. If yes to any of the above, the screen is positive. Patient is contacted and told of the increase in her lisinopril to 15 mg daily.  Patient states she need a refill then.  I have sent this off with the new directions for the patient.  She will have a bp jodi in 1-2 wks. Yudelka MIDDLETON RN

## 2021-10-07 ENCOUNTER — OFFICE VISIT (OUTPATIENT)
Dept: FAMILY MEDICINE | Facility: CLINIC | Age: 86
End: 2021-10-07
Payer: COMMERCIAL

## 2021-10-07 ENCOUNTER — HOSPITAL ENCOUNTER (OUTPATIENT)
Dept: CT IMAGING | Facility: CLINIC | Age: 86
End: 2021-10-07
Attending: NURSE PRACTITIONER
Payer: COMMERCIAL

## 2021-10-07 ENCOUNTER — ANCILLARY PROCEDURE (OUTPATIENT)
Dept: GENERAL RADIOLOGY | Facility: CLINIC | Age: 86
End: 2021-10-07
Attending: NURSE PRACTITIONER
Payer: COMMERCIAL

## 2021-10-07 VITALS
BODY MASS INDEX: 28.64 KG/M2 | SYSTOLIC BLOOD PRESSURE: 146 MMHG | WEIGHT: 189 LBS | OXYGEN SATURATION: 97 % | TEMPERATURE: 97.5 F | HEART RATE: 67 BPM | DIASTOLIC BLOOD PRESSURE: 96 MMHG | HEIGHT: 68 IN

## 2021-10-07 DIAGNOSIS — M25.561 ACUTE PAIN OF RIGHT KNEE: ICD-10-CM
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
## 2021-10-07 DIAGNOSIS — S32.029A CLOSED FRACTURE OF SECOND LUMBAR VERTEBRA, UNSPECIFIED FRACTURE MORPHOLOGY, INITIAL ENCOUNTER (H): ICD-10-CM

## 2021-10-07 DIAGNOSIS — M53.3 SACRAL PAIN: ICD-10-CM

## 2021-10-07 PROCEDURE — 73562 X-RAY EXAM OF KNEE 3: CPT | Mod: RT | Performed by: RADIOLOGY

## 2021-10-07 PROCEDURE — 72131 CT LUMBAR SPINE W/O DYE: CPT

## 2021-10-07 PROCEDURE — 99214 OFFICE O/P EST MOD 30 MIN: CPT | Performed by: NURSE PRACTITIONER

## 2021-10-07 PROCEDURE — 72192 CT PELVIS W/O DYE: CPT

## 2021-10-07 RX ORDER — HYDROCODONE BITARTRATE AND ACETAMINOPHEN 5; 325 MG/1; MG/1
1 TABLET ORAL EVERY 6 HOURS PRN
Qty: 20 TABLET | Refills: 0 | Status: SHIPPED | OUTPATIENT
Start: 2021-10-07 | End: 2021-10-10

## 2021-10-07 RX ORDER — MELOXICAM 7.5 MG/1
7.5 TABLET ORAL DAILY
Qty: 30 TABLET | Refills: 0 | Status: SHIPPED | OUTPATIENT
Start: 2021-10-07 | End: 2021-10-28

## 2021-10-07 RX ORDER — AMLODIPINE BESYLATE 2.5 MG/1
2.5 TABLET ORAL DAILY
COMMUNITY
End: 2022-08-15

## 2021-10-07 ASSESSMENT — MIFFLIN-ST. JEOR: SCORE: 1350.8
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
## 2021-10-07 NOTE — PATIENT INSTRUCTIONS
You have a fracture of the L2 vertebra. I have placed a referral to the spine doctors.  Can use Mobic as needed.  Ice your knee. Rest. Gentle range of motion. If not improving let me know, and can refer to orthopedics.    Combination Pain Medication (Norco, Percocet) Discharge Instructions:  Norco is a combination medication containing a narcotic pain medication (oxycodone/hydrocodone) and Tylenol (acetaminophen).  This medication should be used for the shortest amount of time to treat your acute pain.  There is risk of addiction with long-term use of narcotic pain medications.      You must not take any additional acetaminophen (Tylenol) when you are using this pain medication.  Be aware that many over-the-counter medicines may contain acetaminophen and you need to count any dose of acetaminophen from these products to your daily ingestion, which should not exceed 4000 mg.  Taking too much acetaminophen can cause permanent damage to your liver.    This medication has risk for addiction with prolonged use, so please use sparingly.  Additionally, narcotics are medications that are sedating (will make you sleepy), so do not drive or operate machinery while taking this medication.  Avoid alcohol or other sedating medicines such as benzodiazepines while taking narcotics due to risk for increased sedation and difficulty breathing.      Narcotics will cause constipation.  If you need to take this medication please consider taking an over-the-counter stool softener and laxative, such as Senna Plus, to prevent constipation from developing.  Nausea is a side effect of narcotic use.  Possible additional side effects include vomiting, itching, and dizziness/lightheadedness.    
 used

## 2021-10-12 ENCOUNTER — OFFICE VISIT (OUTPATIENT)
Dept: NEUROSURGERY | Facility: CLINIC | Age: 86
End: 2021-10-12
Payer: COMMERCIAL

## 2021-10-12 VITALS
WEIGHT: 190.8 LBS | HEART RATE: 73 BPM | OXYGEN SATURATION: 96 % | SYSTOLIC BLOOD PRESSURE: 153 MMHG | BODY MASS INDEX: 28.92 KG/M2 | HEIGHT: 68 IN | DIASTOLIC BLOOD PRESSURE: 84 MMHG

## 2021-10-12 DIAGNOSIS — S32.029A CLOSED FRACTURE OF SECOND LUMBAR VERTEBRA, UNSPECIFIED FRACTURE MORPHOLOGY, INITIAL ENCOUNTER (H): ICD-10-CM

## 2021-10-12 PROCEDURE — 99243 OFF/OP CNSLTJ NEW/EST LOW 30: CPT | Performed by: NURSE PRACTITIONER

## 2021-10-12 ASSESSMENT — MIFFLIN-ST. JEOR: SCORE: 1358.96

## 2021-10-12 ASSESSMENT — PAIN SCALES - GENERAL: PAINLEVEL: MILD PAIN (3)
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
## 2021-10-12 NOTE — PATIENT INSTRUCTIONS
-Lumbar MRI ordered. Please contact [phone] to schedule. I will contact you with the results and further recommendations.  -Orthotics referral placed. They will contact you to schedule.  -Likely follow up in 6 weeks with lumbar xray prior.  -Please contact our clinic with questions or concerns at [phone].
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
## 2021-10-12 NOTE — NURSING NOTE
Faxed orthotics referral to orthotics at [phone], [place]. Fax confirmed.   Tanya Avelar Certified Medical Assistant

## 2021-10-14 ENCOUNTER — TELEPHONE (OUTPATIENT)
Dept: NEUROSURGERY | Facility: CLINIC | Age: 86
End: 2021-10-14

## 2021-10-14 NOTE — TELEPHONE ENCOUNTER
"Per Balbina Azar DNP:\"Can we contact the patient and have her reschedule her lumbar MRI for 10/27 or later due to insurance denial. \"    Called patient reviewed above info with her. She verbalized understanding and she will call to r/s MRI. Central scheduling number was provided.     "

## 2021-10-25 ENCOUNTER — VIRTUAL VISIT (OUTPATIENT)
Dept: FAMILY MEDICINE | Facility: CLINIC | Age: 86
End: 2021-10-25
Payer: COMMERCIAL

## 2021-10-25 DIAGNOSIS — Z20.822 EXPOSURE TO 2019 NOVEL CORONAVIRUS: ICD-10-CM

## 2021-10-25 DIAGNOSIS — I10 HYPERTENSION, UNSPECIFIED TYPE: ICD-10-CM

## 2021-10-25 DIAGNOSIS — M53.3 SACRAL PAIN: Primary | ICD-10-CM

## 2021-10-25 PROCEDURE — 99214 OFFICE O/P EST MOD 30 MIN: CPT | Mod: 95 | Performed by: NURSE PRACTITIONER

## 2021-10-25 RX ORDER — HYDROCHLOROTHIAZIDE 12.5 MG/1
12.5 TABLET ORAL DAILY
Qty: 90 TABLET | Refills: 3 | Status: SHIPPED | OUTPATIENT
Start: 2021-10-25 | End: 2023-09-28

## 2021-10-25 NOTE — PATIENT INSTRUCTIONS
There is free COVID testing at the former [facility] site.  Have blood drawn when you come for your MRI on 10/29.  hydrochlorothiazide is refilled.

## 2021-10-25 NOTE — PROGRESS NOTES
"Sena is a 86 year old who is being evaluated via a billable telephone visit.      What phone number would you like to be contacted at? [phone]  How would you like to obtain your AVS? Mail a copy    Assessment & Plan     Sacral pain  Discussed findings of degenerative changes on CT last month. She was just curious regarding these findings and is not interested in doing anything else about it. Advised if hip pain worsens, we can refer her to ortho. She would like to continue to working with her massage therapist on this pain.    Exposure to 2019 novel coronavirus  Exposure 5 days ago. Advised I did order a PCR test, however did also advise there is a state testing site at the [facility] site that both she and her  can obtain testing.  - Asymptomatic COVID-19 Virus (Coronavirus) by PCR Nose; Future    Hypertension, unspecified type  BPs at home have been been controlled. Advised we do need current BMP. This is ordered. She will schedule this when she has her MRI on 10/29.  - Basic metabolic panel  (Ca, Cl, CO2, Creat, Gluc, K, Na, BUN); Future  - hydrochlorothiazide (HYDRODIURIL) 12.5 MG tablet; Take 1 tablet (12.5 mg) by mouth daily         BMI:   Estimated body mass index is 29.01 kg/m  as calculated from the following:    Height as of 10/12/21: 1.727 m (5' 8\").    Weight as of 10/12/21: 86.5 kg (190 lb 12.8 oz).       Patient Instructions   There is free COVID testing at the [facility] site.  Have blood drawn when you come for your MRI on 10/29.  hydrochlorothiazide is refilled.      No follow-ups on file.    MARICRUZ Bañuelos CNP  [facility]    Subjective   Sena is a 86 year old who presents for the following health issues;     HPI     Chief Complaint   Patient presents with     Results     patient had Ct scans done on 10/7/2021, she would like to discuss results.      Covid Concern     patients daughter was over at her house and the very next " morning she tested positive for Covid.  This was thursday of last week.  Patient is currently asymptomatic.  Would like to know if she should get tested now or wait.  She is concerned because they leave for [place] in 10 days.      Hypertension     would like a refill on her Hydrochlorathiazide        Hypertension Follow-up      Do you check your blood pressure regularly outside of the clinic? No     Are you following a low salt diet? Yes    Are your blood pressures ever more than 140 on the top number (systolic) OR more   than 90 on the bottom number (diastolic), for example 140/90? Yes, this AM was 180/105      How many servings of fruits and vegetables do you eat daily?  4 or more    On average, how many sweetened beverages do you drink each day (Examples: soda, juice, sweet tea, etc.  Do NOT count diet or artificially sweetened beverages)?   0    How many days per week do you exercise enough to make your heart beat faster? 3 or less    How many minutes a day do you exercise enough to make your heart beat faster? 9 or less  How many days per week do you miss taking your medication? 1    What makes it hard for you to take your medications?  remembering to take      Review of Systems   Constitutional, HEENT, cardiovascular, pulmonary, gi and gu systems are negative, except as otherwise noted.      Objective           Vitals:  No vitals were obtained today due to virtual visit.    Physical Exam   healthy, alert and no distress  PSYCH: Alert and oriented times 3; coherent speech, normal   rate and volume, able to articulate logical thoughts, able   to abstract reason, no tangential thoughts, no hallucinations   or delusions  Her affect is normal  RESP: No cough, no audible wheezing, able to talk in full sentences  Remainder of exam unable to be completed due to telephone visits                Phone call duration: 13 minutes
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
## 2021-10-28 RX ORDER — MELOXICAM 7.5 MG/1
7.5 TABLET ORAL DAILY
Qty: 15 TABLET | Refills: 0 | Status: SHIPPED | OUTPATIENT
Start: 2021-10-28 | End: 2023-09-28

## 2021-10-28 NOTE — TELEPHONE ENCOUNTER
Pending Prescriptions:                       Disp   Refills    meloxicam (MOBIC) 7.5 MG tablet           30 tab*0            Sig: Take 1 tablet (7.5 mg) by mouth daily    Routing refill request to provider for review/approval because:  Labs out of range:    BP Readings from Last 3 Encounters:   10/12/21 (!) 153/84   10/07/21 (!) 146/96   10/22/19 132/88       Labs not current:  No results found for: ALT  No results found for: AST  Creatinine   Date Value Ref Range Status   08/13/2019 0.82 0.52 - 1.04 mg/dL Final     No results found for: WBC  No results found for: RBC  No results found for: HGB  No results found for: HCT  No results found for: MCV  No results found for: MCH  No results found for: MCHC  No results found for: RDW  No results found for: PLT    Cong Cabrear RN

## 2021-10-29 ENCOUNTER — ANCILLARY PROCEDURE (OUTPATIENT)
Dept: MRI IMAGING | Facility: CLINIC | Age: 86
End: 2021-10-29
Attending: NURSE PRACTITIONER
Payer: COMMERCIAL

## 2021-10-29 DIAGNOSIS — S32.029A CLOSED FRACTURE OF SECOND LUMBAR VERTEBRA, UNSPECIFIED FRACTURE MORPHOLOGY, INITIAL ENCOUNTER (H): ICD-10-CM

## 2021-10-29 PROCEDURE — 72148 MRI LUMBAR SPINE W/O DYE: CPT | Mod: TC | Performed by: RADIOLOGY

## 2021-11-01 ENCOUNTER — LAB (OUTPATIENT)
Dept: LAB | Facility: CLINIC | Age: 86
End: 2021-11-01
Payer: COMMERCIAL

## 2021-11-01 DIAGNOSIS — I10 HYPERTENSION, UNSPECIFIED TYPE: ICD-10-CM

## 2021-11-01 LAB
ANION GAP SERPL CALCULATED.3IONS-SCNC: 2 MMOL/L (ref 3–14)
BUN SERPL-MCNC: 17 MG/DL (ref 7–30)
CALCIUM SERPL-MCNC: 9.5 MG/DL (ref 8.5–10.1)
CHLORIDE BLD-SCNC: 104 MMOL/L (ref 94–109)
CO2 SERPL-SCNC: 31 MMOL/L (ref 20–32)
CREAT SERPL-MCNC: 0.96 MG/DL (ref 0.52–1.04)
GFR SERPL CREATININE-BSD FRML MDRD: 54 ML/MIN/1.73M2
GLUCOSE BLD-MCNC: 102 MG/DL (ref 70–99)
POTASSIUM BLD-SCNC: 4.3 MMOL/L (ref 3.4–5.3)
SODIUM SERPL-SCNC: 137 MMOL/L (ref 133–144)

## 2021-11-01 PROCEDURE — 80048 BASIC METABOLIC PNL TOTAL CA: CPT

## 2021-11-01 PROCEDURE — 36415 COLL VENOUS BLD VENIPUNCTURE: CPT

## 2021-11-03 ENCOUNTER — TELEPHONE (OUTPATIENT)
Dept: NEUROSURGERY | Facility: CLINIC | Age: 86
End: 2021-11-03

## 2021-11-03 NOTE — TELEPHONE ENCOUNTER
As per Balbina Azar NP-C, to  make sure patient is scheduled for a 6 week follow up for her L2 compression fracture and that she has been seen by orthotics for brace fitting.     Spoke with patient and confirmed that she did see and was fitted by Orthotics for Corset brace. She is however leaving for [place] this Tuesday so will not be able to make her followup appt and imaging.   Reached out to Provider to see if there is anything patient needs to do while she is away.

## 2021-12-04 ENCOUNTER — HEALTH MAINTENANCE LETTER (OUTPATIENT)
Age: 86
End: 2021-12-04

## 2022-08-15 ENCOUNTER — OFFICE VISIT (OUTPATIENT)
Dept: FAMILY MEDICINE | Facility: CLINIC | Age: 87
End: 2022-08-15
Payer: COMMERCIAL

## 2022-08-15 VITALS
RESPIRATION RATE: 18 BRPM | OXYGEN SATURATION: 94 % | SYSTOLIC BLOOD PRESSURE: 104 MMHG | DIASTOLIC BLOOD PRESSURE: 72 MMHG | BODY MASS INDEX: 27.89 KG/M2 | TEMPERATURE: 98.8 F | HEART RATE: 72 BPM | HEIGHT: 68 IN | WEIGHT: 184 LBS

## 2022-08-15 DIAGNOSIS — I83.893 VARICOSE VEINS OF BOTH LEGS WITH EDEMA: ICD-10-CM

## 2022-08-15 DIAGNOSIS — I10 BENIGN ESSENTIAL HYPERTENSION: ICD-10-CM

## 2022-08-15 DIAGNOSIS — M16.0 PRIMARY OSTEOARTHRITIS OF BOTH HIPS: ICD-10-CM

## 2022-08-15 DIAGNOSIS — Z79.1 NSAID LONG-TERM USE: ICD-10-CM

## 2022-08-15 DIAGNOSIS — E78.5 DYSLIPIDEMIA: ICD-10-CM

## 2022-08-15 DIAGNOSIS — M11.80 CALCIUM PYROPHOSPHATE ARTHROPATHY: ICD-10-CM

## 2022-08-15 DIAGNOSIS — Z00.00 ENCOUNTER FOR MEDICARE ANNUAL WELLNESS EXAM: Primary | ICD-10-CM

## 2022-08-15 DIAGNOSIS — R60.0 BILATERAL LEG EDEMA: ICD-10-CM

## 2022-08-15 DIAGNOSIS — E03.9 HYPOTHYROIDISM, UNSPECIFIED TYPE: ICD-10-CM

## 2022-08-15 LAB
ANION GAP SERPL CALCULATED.3IONS-SCNC: 5 MMOL/L (ref 3–14)
BUN SERPL-MCNC: 32 MG/DL (ref 7–30)
CALCIUM SERPL-MCNC: 8.8 MG/DL (ref 8.5–10.1)
CHLORIDE BLD-SCNC: 106 MMOL/L (ref 94–109)
CO2 SERPL-SCNC: 30 MMOL/L (ref 20–32)
CREAT SERPL-MCNC: 0.91 MG/DL (ref 0.52–1.04)
CREAT UR-MCNC: 125 MG/DL
GFR SERPL CREATININE-BSD FRML MDRD: 61 ML/MIN/1.73M2
GLUCOSE BLD-MCNC: 90 MG/DL (ref 70–99)
MICROALBUMIN UR-MCNC: 17 MG/L
MICROALBUMIN/CREAT UR: 13.6 MG/G CR (ref 0–25)
POTASSIUM BLD-SCNC: 4 MMOL/L (ref 3.4–5.3)
SODIUM SERPL-SCNC: 141 MMOL/L (ref 133–144)
TSH SERPL DL<=0.005 MIU/L-ACNC: 0.54 MU/L (ref 0.4–4)

## 2022-08-15 PROCEDURE — 36415 COLL VENOUS BLD VENIPUNCTURE: CPT | Performed by: FAMILY MEDICINE

## 2022-08-15 PROCEDURE — 82043 UR ALBUMIN QUANTITATIVE: CPT | Performed by: FAMILY MEDICINE

## 2022-08-15 PROCEDURE — 80048 BASIC METABOLIC PNL TOTAL CA: CPT | Performed by: FAMILY MEDICINE

## 2022-08-15 PROCEDURE — 99397 PER PM REEVAL EST PAT 65+ YR: CPT | Performed by: FAMILY MEDICINE

## 2022-08-15 PROCEDURE — 99214 OFFICE O/P EST MOD 30 MIN: CPT | Mod: 25 | Performed by: FAMILY MEDICINE

## 2022-08-15 PROCEDURE — 84443 ASSAY THYROID STIM HORMONE: CPT | Performed by: FAMILY MEDICINE

## 2022-08-15 RX ORDER — AMLODIPINE BESYLATE 2.5 MG/1
2.5 TABLET ORAL DAILY
Qty: 90 TABLET | Refills: 3 | Status: SHIPPED | OUTPATIENT
Start: 2022-08-15 | End: 2023-09-28

## 2022-08-15 RX ORDER — LEVOTHYROXINE SODIUM 100 UG/1
100 TABLET ORAL DAILY
Qty: 90 TABLET | Refills: 3 | Status: SHIPPED | OUTPATIENT
Start: 2022-08-15 | End: 2023-09-21

## 2022-08-15 ASSESSMENT — ENCOUNTER SYMPTOMS
NERVOUS/ANXIOUS: 0
JOINT SWELLING: 1
EYE PAIN: 0
FREQUENCY: 0
SHORTNESS OF BREATH: 0
PARESTHESIAS: 1
ARTHRALGIAS: 1
HEADACHES: 0
HEMATURIA: 0
CONSTIPATION: 0
HEARTBURN: 0
SORE THROAT: 0
MYALGIAS: 1
DIZZINESS: 0
DYSURIA: 0
COUGH: 0
FEVER: 0
NAUSEA: 0
PALPITATIONS: 0
ABDOMINAL PAIN: 0
CHILLS: 0
DIARRHEA: 1
WEAKNESS: 1
HEMATOCHEZIA: 0

## 2022-08-15 ASSESSMENT — ACTIVITIES OF DAILY LIVING (ADL): CURRENT_FUNCTION: NO ASSISTANCE NEEDED

## 2022-08-15 ASSESSMENT — PAIN SCALES - GENERAL: PAINLEVEL: MODERATE PAIN (5)

## 2022-08-15 NOTE — PATIENT INSTRUCTIONS
To schedule the ultrasound of the legs, call [phone].     You will be contacted in 1-2 business days to get a schedule for the orthopedic specialist.    You will be contacted in 1-2 days for results of your lab tests.     Schedule fasting lab appointment for your cholesterol levell.    Further recommendations to bee given when results are out.    Reconsider completing your vaccinations: pneumonia vaccine, shingles vaccine and flu vaccine (in the fall).    Preventative Care Visits include: Yearly physicals, Well-child visits, Welcome to Medicare visits, & Medicare yearly wellness exams.    The purpose of these visits is to discuss your medical history and prevent health problems before you are sick.  You may need to pay a copay, coinsurance or deductible if your visit today includes testing or treating for a new or existing condition.    Additional charges may be incurred for today's visit. If you have questions about what your insurance plan covers, please contact your Insurance Company's member service department.  If you have questions specific to a bill you have already received from [employer], please contact the [facility] at [phone].      Patient Education   Personalized Prevention Plan  You are due for the preventive services outlined below.  Your care team is available to assist you in scheduling these services.  If you have already completed any of these items, please share that information with your care team to update in your medical record.  Health Maintenance Due   Topic Date Due    ANNUAL REVIEW OF HM ORDERS  Never done    Discuss Advance Care Planning  Never done    Zoster (Shingles) Vaccine (1 of 2) Never done    Pneumococcal Vaccine (1 - PCV) Never done     Preventive Health Recommendations    See your health care provider every year to  Review health changes.   Discuss preventive care.    Review your medicines if your doctor has prescribed any.  You no longer need a yearly Pap  test unless you've had an abnormal Pap test in the past 10 years. If you have vaginal symptoms, such as bleeding or discharge, be sure to talk with your provider about a Pap test.  Every 1 to 2 years, have a mammogram.  If you are over 69, talk with your health care provider about whether or not you want to continue having screening mammograms.  Every 10 years, have a colonoscopy. Or, have a yearly FIT test (stool test). These exams will check for colon cancer.   Have a cholesterol test every 5 years, or more often if your doctor advises it.   Have a diabetes test (fasting glucose) every three years. If you are at risk for diabetes, you should have this test more often.   At age 65, have a bone density scan (DEXA) to check for osteoporosis (brittle bone disease).    Shots:  Get a flu shot each year.  Get a tetanus shot every 10 years.  Talk to your doctor about your pneumonia vaccines. There are now two you should receive - Pneumovax (PPSV 23) and Prevnar (PCV 13).  Talk to your pharmacist about the shingles vaccine.  Talk to your doctor about the hepatitis B vaccine.    Nutrition:   Eat at least 5 servings of fruits and vegetables each day.  Eat whole-grain bread, whole-wheat pasta and brown rice instead of white grains and rice.  Get adequate Calcium and Vitamin D.     Lifestyle  Exercise at least 150 minutes a week (30 minutes a day, 5 days a week). This will help you control your weight and prevent disease.  Limit alcohol to one drink per day.  No smoking.   Wear sunscreen to prevent skin cancer.   See your dentist twice a year for an exam and cleaning.  See your eye doctor every 1 to 2 years to screen for conditions such as glaucoma, macular degeneration and cataracts.    Personalized Prevention Plan  You are due for the preventive services outlined below.  Your care team is available to assist you in scheduling these services.  If you have already completed any of these items, please share that information  with your care team to update in your medical record.  Health Maintenance   Topic Date Due    ANNUAL REVIEW OF HM ORDERS  Never done    ADVANCE CARE PLANNING  Never done    ZOSTER IMMUNIZATION (1 of 2) Never done    Pneumococcal Vaccine: 65+ Years (1 - PCV) Never done    COVID-19 Vaccine (4 - Booster for Pfizer series) 08/30/2022    INFLUENZA VACCINE (1) 09/01/2022    MEDICARE ANNUAL WELLNESS VISIT  08/15/2023    FALL RISK ASSESSMENT  08/15/2023    DTAP/TDAP/TD IMMUNIZATION (2 - Td or Tdap) 08/13/2029    PHQ-2 (once per calendar year)  Completed    IPV IMMUNIZATION  Aged Out    MENINGITIS IMMUNIZATION  Aged Out    HEPATITIS B IMMUNIZATION  Aged Out     Your Health Risk Assessment indicates you feel you are not in good health    A healthy lifestyle helps keep the body fit and the mind alert. It helps protect you from disease, helps you fight disease, and helps prevent chronic disease (disease that doesn't go away) from getting worse. This is important as you get older and begin to notice twinges in muscles and joints and a decline in the strength and stamina you once took for granted. A healthy lifestyle includes good healthcare, good nutrition, weight control, recreation, and regular exercise. Avoid harmful substances and do what you can to keep safe. Another part of a healthy lifestyle is stay mentally active and socially involved.    Good healthcare   Have a wellness visit every year.   If you have new symptoms, let us know right away. Don't wait until the next checkup.   Take medicines exactly as prescribed and keep your medicines in a safe place. Tell us if your medicine causes problems.   Healthy diet and weight control   Eat 3 or 4 small, nutritious, low-fat, high-fiber meals a day. Include a variety of fruits, vegetables, and whole-grain foods.   Make sure you get enough calcium in your diet. Calcium, vitamin D, and exercise help prevent osteoporosis (bone thinning).   If you live alone, try eating with  others when you can. That way you get a good meal and have company while you eat it.   Try to keep a healthy weight. If you eat more calories than your body uses for energy, it will be stored as fat and you will gain weight.     Recreation   Recreation is not limited to sports and team events. It includes any activity that provides relaxation, interest, enjoyment, and exercise. Recreation provides an outlet for physical, mental, and social energy. It can give a sense of worth and achievement. It can help you stay healthy.    Mental Exercise and Social Involvement  Mental and emotional health is as important as physical health. Keep in touch with friends and family. Stay as active as possible. Continue to learn and challenge yourself.   Things you can do to stay mentally active are:  Learn something new, like a foreign language or musical instrument.   Play SCRABBLE or do crossword puzzles. If you cannot find people to play these games with you at home, you can play them with others on your computer through the Internet.   Join a games club--anything from card games to chess or checkers or lawn bowling.   Start a new hobby.   Go back to school.   Volunteer.   Read.   Keep up with world events.    Exercise for a Healthier Heart  You may wonder how you can improve the health of your heart. If you re thinking about exercise, you re on the right track. You don t need to become an athlete. But you do need a certain amount of brisk exercise to help strengthen your heart. If you have been diagnosed with a heart condition, your healthcare provider may advise exercise to help stabilize your condition. To help make exercise a habit, choose safe, fun activities.      Exercise with a friend. When activity is fun, you're more likely to stick with it.   Before you start  Check with your healthcare provider before starting an exercise program. This is especially important if you have not been active for a while. It's also important  if you have a long-term (chronic) health problem such as heart disease, diabetes, or obesity. Or if you are at high risk for having these problems.   Why exercise?  Exercising regularly offers many healthy rewards. It can help you do all of the following:   Improve your blood cholesterol level to help prevent further heart trouble  Lower your blood pressure to help prevent a stroke or heart attack  Control diabetes, or reduce your risk of getting this disease  Improve your heart and lung function  Reach and stay at a healthy weight  Make your muscles stronger so you can stay active  Prevent falls and fractures by slowing the loss of bone mass (osteoporosis)  Manage stress better  Reduce your blood pressure  Improve your sense of self and your body image  Exercise tips    Ease into your routine. Set small goals. Then build on them. If you are not sure what your activity level should be, talk with your healthcare provider first before starting an exercise routine.  Exercise on most days. Aim for a total of 150 minutes (2 hours and 30 minutes) or more of moderate-intensity aerobic activity each week. Or 75 minutes (1 hour and 15 minutes) or more of vigorous-intensity aerobic activity each week. Or try for a combination of both. Moderate activity means that you breathe heavier and your heart rate increases but you can still talk. Think about doing 40 minutes of moderate exercise, 3 to 4 times a week. For best results, activity should last for about 40 minutes to lower blood pressure and cholesterol. It's OK to work up to the 40-minute period over time. Examples of moderate-intensity activity are walking 1 mile in 15 minutes. Or doing 30 to 45 minutes of yard work.  Step up your daily activity level.  Along with your exercise program, try being more active the whole day. Walk instead of drive. Or park further away so that you take more steps each day. Do more household tasks or yard work. You may not be able to meet the  advised mount of physical activity. But doing some moderate- or vigorous-intensity aerobic activity can help reduce your risk for heart disease. Your healthcare provider can help you figure out what is best for you.  Choose 1 or more activities you enjoy.  Walking is one of the easiest things you can do. You can also try swimming, riding a bike, dancing, or taking an exercise class.    When to call your healthcare provider  Call your healthcare provider if you have any of these:   Chest pain or feel dizzy or lightheaded  Burning, tightness, pressure, or heaviness in your chest, neck, shoulders, back, or arms  Abnormal shortness of breath  More joint or muscle pain  A very fast or irregular heartbeat (palpitations)  [employer] last reviewed this educational content on 7/1/2019 2000-2021 [employer]. All rights reserved. This information is not intended as a substitute for professional medical care. Always follow your healthcare professional's instructions.          Understanding USDA MyPlate  The USDA has guidelines to help you make healthy food choices. These are called MyPlate. MyPlate shows the food groups that make up healthy meals using the image of a place setting. Before you eat, think about the healthiest choices for what to put on your plate or in your cup or bowl. To learn more about building a healthy plate, visit www.choosemyplate.gov.    The food groups  Fruits. Any fruit or 100% fruit juice counts as part of the Fruit Group. Fruits may be fresh, canned, frozen, or dried, and may be whole, cut-up, or pureed. Make 1/2 of your plate fruits and vegetables.  Vegetables. Any vegetable or 100% vegetable juice counts as a member of the Vegetable Group. Vegetables may be fresh, frozen, canned, or dried. They can be served raw or cooked and may be whole, cut-up, or mashed. Make 1/2 of your plate fruits and vegetables.  Grains. All foods made from grains are part of the Grains Group. These include  wheat, rice, oats, cornmeal, and barley. Grains are often used to make foods such as bread, pasta, oatmeal, cereal, tortillas, and grits. Grains should be no more than 1/4 of your plate. At least half of your grains should be whole grains.  Protein. This group includes meat, poultry, seafood, beans and peas, eggs, processed soy products (such as tofu), nuts (including nut butters), and seeds. Make protein choices no more than 1/4 of your plate. Meat and poultry choices should be lean or low fat.  Dairy. The Dairy Group includes all fluid milk products and foods made from milk that contain calcium, such as yogurt and cheese. (Foods that have little calcium, such as cream, butter, and cream cheese, are not part of this group.) Most dairy choices should be low-fat or fat-free.  Oils. Oils aren't a food group, but they do contain essential nutrients. However it's important to watch your intake of oils. These are fats that are liquid at room temperature. They include canola, corn, olive, soybean, vegetable, and sunflower oil. Foods that are mainly oil include mayonnaise, certain salad dressings, and soft margarines. You likely already get your daily oil allowance from the foods you eat.  Things to limit  Eating healthy also means limiting these things in your diet:     Salt (sodium). Many processed foods have a lot of sodium. To keep sodium intake down, eat fresh vegetables, meats, poultry, and seafood when possible. Purchase low-sodium, reduced-sodium, or no-salt-added food products at the store. And don't add salt to your meals at home. Instead, season them with herbs and spices such as dill, oregano, cumin, and paprika. Or try adding flavor with lemon or lime zest and juice.  Saturated fat. Saturated fats are most often found in animal products such as beef, pork, and chicken. They are often solid at room temperature, such as butter. To reduce your saturated fat intake, choose leaner cuts of meat and poultry. And try  healthier cooking methods such as grilling, broiling, roasting, or baking. For a simple lower-fat swap, use plain nonfat yogurt instead of mayonnaise when making potato salad or macaroni salad.  Added sugars. These are sugars added to foods. They are in foods such as ice cream, candy, soda, fruit drinks, sports drinks, energy drinks, cookies, pastries, jams, and syrups. Cut down on added sugars by sharing sweet treats with a family member or friend. You can also choose fruit for dessert, and drink water or other unsweetened beverages.     [employer] last reviewed this educational content on 6/1/2020 2000-2021 [employer]. All rights reserved. This information is not intended as a substitute for professional medical care. Always follow your healthcare professional's instructions.          Signs of Hearing Loss      Hearing much better with one ear can be a sign of hearing loss.   Hearing loss is a problem shared by many people. In fact, it is one of the most common health problems, particularly as people age. Most people age 65 and older have some hearing loss. By age 80, almost everyone does. Hearing loss often occurs slowly over the years. So you may not realize your hearing has gotten worse.  Have your hearing checked  Call your healthcare provider if you:  Have to strain to hear normal conversation  Have to watch other people s faces very carefully to follow what they re saying  Need to ask people to repeat what they ve said  Often misunderstand what people are saying  Turn the volume of the television or radio up so high that others complain  Feel that people are mumbling when they re talking to you  Find that the effort to hear leaves you feeling tired and irritated  Notice, when using the phone, that you hear better with one ear than the other  [employer] last reviewed this educational content on 1/1/2020 2000-2021 [employer]. All rights reserved. This information is not intended  as a substitute for professional medical care. Always follow your healthcare professional's instructions.          Urinary Incontinence, Female (Adult)   Urinary incontinence means loss of bladder control. This problem affects many women, especially as they get older. If you have incontinence, you may be embarrassed to ask for help. But know that this problem can be treated.   Types of Incontinence  There are different types of incontinence. Two of the main types are described here. You can have more than one type.   Stress incontinence. With this type, urine leaks when pressure (stress) is put on the bladder. This may happen when you cough, sneeze, or laugh. Stress incontinence most often occurs because the pelvic floor muscles that support the bladder and urethra are weak. This can happen after pregnancy and vaginal childbirth or a hysterectomy. It can also be due to excess body weight or hormone changes.  Urge incontinence (also called overactive bladder). With this type, a sudden urge to urinate is felt often. This may happen even though there may not be much urine in the bladder. The need to urinate often during the night is common. Urge incontinence most often occurs because of bladder spasms. This may be due to bladder irritation or infection. Damage to bladder nerves or pelvic muscles, constipation, and certain medicines can also lead to urge incontinence.  Treatment depends on the cause. Further evaluation is needed to find the type you have. This will likely include an exam and certain tests. Based on the results, you and your healthcare provider can then plan treatment. Until a diagnosis is made, the home care tips below can help ease symptoms.   Home care  Do pelvic floor muscle exercises, if they are prescribed. The pelvic floor muscles help support the bladder and urethra. Many women find that their symptoms improve when doing special exercises that strengthen these muscles. To do the exercises,  contract the muscles you would use to stop your stream of urine. But do this when you re not urinating. Hold for 10 seconds, then relax. Repeat 10 to 20 times in a row, at least 3 times a day. Your healthcare provider may give you other instructions for how to do the exercises and how often.  Keep a bladder diary. This helps track how often and how much you urinate over a set period of time. Bring this diary with you to your next visit with the provider. The information can help your provider learn more about your bladder problem.  Lose weight, if advised to by your provider. Extra weight puts pressure on the bladder. Your provider can help you create a weight-loss plan that s right for you. This may include exercising more and making certain diet changes.  Don't have foods and drinks that may irritate the bladder. These can include alcohol and caffeinated drinks.  Quit smoking. Smoking and other tobacco use can lead to a long-term (chronic) cough that strains the pelvic floor muscles. Smoking may also damage the bladder and urethra. Talk with your provider about treatments or methods you can use to quit smoking.  If drinking large amounts of fluid makes you have symptoms, you may be advised to limit your fluid intake. You may also be advised to drink most of your fluids during the day and to limit fluids at night.  If you re worried about urine leakage or accidents, you may wear absorbent pads to catch urine. Change the pads often. This helps reduce discomfort. It may also reduce the risk of skin or bladder infections.    Follow-up care  Follow up with your healthcare provider, or as directed. It may take some to find the right treatment for your problem. But healthy lifestyle changes can be made right away. These include such things as exercising on a regular basis, eating a healthy diet, losing weight (if needed), and quitting smoking. Your treatment plan may include special therapies or medicines. Certain  procedures or surgery may also be options. Talk about any questions you have with your provider.   When to seek medical advice  Call the healthcare provider right away if any of these occur:  Fever of 100.4 F (38 C) or higher, or as directed by your provider  Bladder pain or fullness  Belly swelling  Nausea or vomiting  Back pain  Weakness, dizziness, or fainting  [employer] last reviewed this educational content on 1/1/2020 2000-2021 [employer]. All rights reserved. This information is not intended as a substitute for professional medical care. Always follow your healthcare professional's instructions.

## 2022-08-15 NOTE — PROGRESS NOTES
The patient was provided with suggestions to help her develop a healthy physical lifestyle.  She is at risk for lack of exercise and has been provided with information to increase physical activity for the benefit of her well-being.  The patient was counseled and encouraged to consider modifying their diet and eating habits. She was provided with information on recommended healthy diet options.  The patient was provided with written information regarding signs of hearing loss.  Information on urinary incontinence and treatment options given to patient.
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
## 2022-09-18 ENCOUNTER — HEALTH MAINTENANCE LETTER (OUTPATIENT)
Age: 87
End: 2022-09-18

## 2023-03-18 NOTE — PROGRESS NOTES
Sena Flores was evaluated at [facility] on October 8, 2019 at which time her blood pressure was:    BP Readings from Last 3 Encounters:   10/08/19 138/82   09/13/19 (!) 148/86   08/27/19 (!) 180/96     Pulse Readings from Last 3 Encounters:   09/13/19 76   08/27/19 68   08/20/19 72       Reviewed lifestyle modifications for blood pressure control and reduction: including making healthy food choices, managing weight, getting regular exercise, smoking cessation, reducing alcohol consumption, monitoring blood pressure regularly.     Symptoms: None    BP Goal:< 140/90 mmHg    BP Assessment:  BP at goal    Potential Reasons for BP too high: NA - Not applicable    BP Follow-Up Plan: Other: Routing to Provider per patient request as she will be leaving for [place] and wanted provider to be aware of current reading.    Recommendation to Provider: n/a     Note completed by: Audelia Andres  Pharm.D.  [facility]  [place] Pharmacist  On Behalf of [facility]       - - - Unknown

## 2023-07-12 ENCOUNTER — OFFICE VISIT (OUTPATIENT)
Dept: FAMILY MEDICINE | Facility: CLINIC | Age: 88
End: 2023-07-12
Payer: COMMERCIAL

## 2023-07-12 VITALS
OXYGEN SATURATION: 93 % | RESPIRATION RATE: 16 BRPM | WEIGHT: 185.2 LBS | HEART RATE: 75 BPM | HEIGHT: 67 IN | SYSTOLIC BLOOD PRESSURE: 110 MMHG | BODY MASS INDEX: 29.07 KG/M2 | DIASTOLIC BLOOD PRESSURE: 68 MMHG | TEMPERATURE: 99.3 F

## 2023-07-12 DIAGNOSIS — S32.020S COMPRESSION FRACTURE OF L2 VERTEBRA, SEQUELA: ICD-10-CM

## 2023-07-12 DIAGNOSIS — K59.00 CONSTIPATION, UNSPECIFIED CONSTIPATION TYPE: ICD-10-CM

## 2023-07-12 DIAGNOSIS — N39.0 URINARY TRACT INFECTION IN FEMALE: ICD-10-CM

## 2023-07-12 DIAGNOSIS — N32.81 OAB (OVERACTIVE BLADDER): ICD-10-CM

## 2023-07-12 DIAGNOSIS — L98.9 SKIN LESION: ICD-10-CM

## 2023-07-12 DIAGNOSIS — R35.0 URINARY FREQUENCY: Primary | ICD-10-CM

## 2023-07-12 LAB
ALBUMIN UR-MCNC: NEGATIVE MG/DL
APPEARANCE UR: ABNORMAL
BACTERIA #/AREA URNS HPF: ABNORMAL /HPF
BILIRUB UR QL STRIP: NEGATIVE
COLOR UR AUTO: YELLOW
GLUCOSE UR STRIP-MCNC: NEGATIVE MG/DL
HGB UR QL STRIP: ABNORMAL
KETONES UR STRIP-MCNC: NEGATIVE MG/DL
LEUKOCYTE ESTERASE UR QL STRIP: ABNORMAL
NITRATE UR QL: POSITIVE
PH UR STRIP: 6 [PH] (ref 5–7)
RBC #/AREA URNS AUTO: ABNORMAL /HPF
SP GR UR STRIP: 1.01 (ref 1–1.03)
SQUAMOUS #/AREA URNS AUTO: ABNORMAL /LPF
UROBILINOGEN UR STRIP-ACNC: 0.2 E.U./DL
WBC #/AREA URNS AUTO: ABNORMAL /HPF

## 2023-07-12 PROCEDURE — 87086 URINE CULTURE/COLONY COUNT: CPT | Performed by: FAMILY MEDICINE

## 2023-07-12 PROCEDURE — 99214 OFFICE O/P EST MOD 30 MIN: CPT | Performed by: FAMILY MEDICINE

## 2023-07-12 PROCEDURE — 81001 URINALYSIS AUTO W/SCOPE: CPT | Performed by: FAMILY MEDICINE

## 2023-07-12 RX ORDER — DOCUSATE SODIUM 100 MG/1
100 CAPSULE, LIQUID FILLED ORAL 2 TIMES DAILY
Qty: 60 CAPSULE | Refills: 1 | Status: SHIPPED | OUTPATIENT
Start: 2023-07-12 | End: 2023-09-28

## 2023-07-12 RX ORDER — NITROFURANTOIN 25; 75 MG/1; MG/1
100 CAPSULE ORAL 2 TIMES DAILY
Qty: 10 CAPSULE | Refills: 0 | Status: SHIPPED | OUTPATIENT
Start: 2023-07-12 | End: 2023-07-17

## 2023-07-12 ASSESSMENT — PAIN SCALES - GENERAL: PAINLEVEL: NO PAIN (0)
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
## 2023-07-12 NOTE — PATIENT INSTRUCTIONS
To schedule the ultrasound, call [phone].     Referrals to physical therapy and dermatology have been signed. Schedulers will call you in the next 3-5 business days.     Start colace as prescribed.  You may hold metamucil for now.  If still with large caliber stool or constipation after 2-3 weeks, schedule a virtual visit for a follow up.    Think about referral to spine clinic and urology - you deferred these for now.

## 2023-07-16 LAB
BACTERIA UR CULT: ABNORMAL
BACTERIA UR CULT: ABNORMAL

## 2023-07-20 ENCOUNTER — HOSPITAL ENCOUNTER (OUTPATIENT)
Dept: ULTRASOUND IMAGING | Facility: CLINIC | Age: 88
Discharge: HOME | End: 2023-07-20
Attending: FAMILY MEDICINE | Admitting: FAMILY MEDICINE
Payer: COMMERCIAL

## 2023-07-20 DIAGNOSIS — N32.81 OAB (OVERACTIVE BLADDER): ICD-10-CM

## 2023-07-20 DIAGNOSIS — R35.0 URINARY FREQUENCY: ICD-10-CM

## 2023-07-20 PROCEDURE — 76857 US EXAM PELVIC LIMITED: CPT

## 2023-07-21 ENCOUNTER — OFFICE VISIT (OUTPATIENT)
Dept: DERMATOLOGY | Facility: CLINIC | Age: 88
End: 2023-07-21
Payer: COMMERCIAL

## 2023-07-21 DIAGNOSIS — L82.1 SEBORRHEIC KERATOSIS: ICD-10-CM

## 2023-07-21 DIAGNOSIS — L81.4 LENTIGO: ICD-10-CM

## 2023-07-21 DIAGNOSIS — D22.9 MULTIPLE BENIGN NEVI: ICD-10-CM

## 2023-07-21 DIAGNOSIS — D48.5 NEOPLASM OF UNCERTAIN BEHAVIOR OF SKIN: ICD-10-CM

## 2023-07-21 DIAGNOSIS — D18.01 CHERRY ANGIOMA: ICD-10-CM

## 2023-07-21 DIAGNOSIS — L57.0 ACTINIC KERATOSIS: Primary | ICD-10-CM

## 2023-07-21 PROCEDURE — 99203 OFFICE O/P NEW LOW 30 MIN: CPT | Mod: 25 | Performed by: PHYSICIAN ASSISTANT

## 2023-07-21 PROCEDURE — 17000 DESTRUCT PREMALG LESION: CPT | Mod: 59 | Performed by: PHYSICIAN ASSISTANT

## 2023-07-21 PROCEDURE — 88305 TISSUE EXAM BY PATHOLOGIST: CPT | Performed by: DERMATOLOGY

## 2023-07-21 PROCEDURE — 11102 TANGNTL BX SKIN SINGLE LES: CPT | Performed by: PHYSICIAN ASSISTANT

## 2023-07-21 ASSESSMENT — PAIN SCALES - GENERAL: PAINLEVEL: NO PAIN (0)

## 2023-07-21 NOTE — PROGRESS NOTES
Sena Flores is an extremely pleasant 88 year old year old female patient here today for spot on chest. She notes present for months. She notes raised, will pick at it and it will bleed.  Patient has no other skin complaints today.  Remainder of the HPI, Meds, PMH, Allergies, FH, and SH was reviewed in chart.    Pertinent Hx:  no personal history of skin cancer   Past Medical History:   Diagnosis Date    Arthritis     Hypertension     Hypothyroidism, unspecified type 10/31/2019       Past Surgical History:   Procedure Laterality Date    APPENDECTOMY      COLONOSCOPY          Family History   Problem Relation Age of Onset    Hypertension Mother     Coronary Artery Disease Mother     Colon Cancer Brother     Other Cancer Brother     Cerebrovascular Disease Maternal Grandmother     Coronary Artery Disease Maternal Grandfather     Alzheimer Disease Paternal Grandmother     Thyroid Disease Sister     Other Cancer Brother         lung    Rheumatoid Arthritis Sister        Social History     Socioeconomic History    Marital status:      Spouse name: Not on file    Number of children: Not on file    Years of education: Not on file    Highest education level: Not on file   Occupational History    Not on file   Tobacco Use    Smoking status: Former     Types: Cigarettes    Smokeless tobacco: Never   Vaping Use    Vaping Use: Never used   Substance and Sexual Activity    Alcohol use: No     Comment: rare beer    Drug use: No    Sexual activity: Not Currently   Other Topics Concern    Parent/sibling w/ CABG, MI or angioplasty before 65F 55M? No   Social History Narrative    Not on file     Social Determinants of Health     Financial Resource Strain: Not on file   Food Insecurity: Not on file   Transportation Needs: Not on file   Physical Activity: Not on file   Stress: Not on file   Social Connections: Not on file   Intimate Partner Violence: Not on file   Housing Stability: Not on file       Outpatient Encounter  Medications as of 7/21/2023   Medication Sig Dispense Refill    amLODIPine (NORVASC) 2.5 MG tablet Take 1 tablet (2.5 mg) by mouth daily 90 tablet 3    docusate sodium (COLACE) 100 MG capsule Take 1 capsule (100 mg) by mouth 2 times daily Until you have a regular bowel movement. 60 capsule 1    hydrochlorothiazide (HYDRODIURIL) 12.5 MG tablet Take 1 tablet (12.5 mg) by mouth daily 90 tablet 3    levothyroxine (SYNTHROID/LEVOTHROID) 100 MCG tablet Take 1 tablet (100 mcg) by mouth daily 90 tablet 3    meloxicam (MOBIC) 7.5 MG tablet Take 1 tablet (7.5 mg) by mouth daily Schedule follow up if needing more pain control after 2 weeks. 15 tablet 0     No facility-administered encounter medications on file as of 7/21/2023.             O:   NAD, WDWN, Alert & Oriented, Mood & Affect wnl, Vitals stable   Here today alone   There were no vitals taken for this visit.   General appearance normal   Vitals stable   Alert, oriented and in no acute distress     0.6 cm pink scaly papule on left upper chest   Gritty papule on left forearm   Stuck on papules and brown macules on trunk and ext   Red papules on trunk  Brown papules and macules with regular pigment network and borders on torso and extremities      The remainder of skin exam is normal     Eyes: Conjunctivae/lids:Normal     ENT: Lips: normal    MSK:Normal    Cardiovascular: peripheral edema none    Pulm: Breathing Normal    Neuro/Psych: Orientation:Alert and Orientedx3 ; Mood/Affect:normal   A/P:  1. R/O ISK vs SCC on left upper chest   TANGENTIAL BIOPSY SENT OUT:  After consent, anesthesia with LEC and prep, tangential excision performed and specimen sent out for permanent section histology.  No complications and routine wound care. Patient told to call our office in 1-2 weeks for result.      2. Actinic keratosis on left forearm x 1  LN2:  Treated with LN2 for 5s for 1-2 cycles. Warned risks of blistering, pain, pigment change, scarring, and incomplete resolution.   Advised patient to return if lesions do not completely resolve.  Wound care sheet given.  3. Seborrheic keratosis, lentigo, angioma, benign nevi   It was a pleasure speaking to Sena Flores today.  BENIGN LESIONS DISCUSSED WITH PATIENT:  I discussed the specifics of tumor, prognosis, and genetics of benign lesions.  I explained that treatment of these lesions would be purely cosmetic and not medically neccessary.  I discussed with patient different removal options including excision, cautery and /or laser.      Nature and genetics of benign skin lesions dicussed with patient.  Signs and Symptoms of skin cancer discussed with patient.  ABCDEs of melanoma reviewed with patient.  Patient encouraged to perform monthly skin exams.  UV precautions reviewed with patient.  Risks of non-melanoma skin cancer discussed with patient   Return to clinic pending biopsy results.

## 2023-07-21 NOTE — LETTER
7/21/2023         RE: Sena Flores  [address] [facility] [id number]        Dear Colleague,    Thank you for referring your patient, Sena Flores, to the [facility]. Please see a copy of my visit note below.    Sena Flores is an extremely pleasant 88 year old year old female patient here today for spot on chest. She notes present for months. She notes raised, will pick at it and it will bleed.  Patient has no other skin complaints today.  Remainder of the HPI, Meds, PMH, Allergies, FH, and SH was reviewed in chart.    Pertinent Hx:  no personal history of skin cancer   Past Medical History:   Diagnosis Date     Arthritis      Hypertension      Hypothyroidism, unspecified type 10/31/2019       Past Surgical History:   Procedure Laterality Date     APPENDECTOMY       COLONOSCOPY          Family History   Problem Relation Age of Onset     Hypertension Mother      Coronary Artery Disease Mother      Colon Cancer Brother      Other Cancer Brother      Cerebrovascular Disease Maternal Grandmother      Coronary Artery Disease Maternal Grandfather      Alzheimer Disease Paternal Grandmother      Thyroid Disease Sister      Other Cancer Brother         lung     Rheumatoid Arthritis Sister        Social History     Socioeconomic History     Marital status:      Spouse name: Not on file     Number of children: Not on file     Years of education: Not on file     Highest education level: Not on file   Occupational History     Not on file   Tobacco Use     Smoking status: Former     Types: Cigarettes     Smokeless tobacco: Never   Vaping Use     Vaping Use: Never used   Substance and Sexual Activity     Alcohol use: No     Comment: rare beer     Drug use: No     Sexual activity: Not Currently   Other Topics Concern     Parent/sibling w/ CABG, MI or angioplasty before 65F 55M? No   Social History Narrative     Not on file     Social Determinants of Health     Financial Resource  Strain: Not on file   Food Insecurity: Not on file   Transportation Needs: Not on file   Physical Activity: Not on file   Stress: Not on file   Social Connections: Not on file   Intimate Partner Violence: Not on file   Housing Stability: Not on file       Outpatient Encounter Medications as of 7/21/2023   Medication Sig Dispense Refill     amLODIPine (NORVASC) 2.5 MG tablet Take 1 tablet (2.5 mg) by mouth daily 90 tablet 3     docusate sodium (COLACE) 100 MG capsule Take 1 capsule (100 mg) by mouth 2 times daily Until you have a regular bowel movement. 60 capsule 1     hydrochlorothiazide (HYDRODIURIL) 12.5 MG tablet Take 1 tablet (12.5 mg) by mouth daily 90 tablet 3     levothyroxine (SYNTHROID/LEVOTHROID) 100 MCG tablet Take 1 tablet (100 mcg) by mouth daily 90 tablet 3     meloxicam (MOBIC) 7.5 MG tablet Take 1 tablet (7.5 mg) by mouth daily Schedule follow up if needing more pain control after 2 weeks. 15 tablet 0     No facility-administered encounter medications on file as of 7/21/2023.             O:   NAD, WDWN, Alert & Oriented, Mood & Affect wnl, Vitals stable   Here today alone   There were no vitals taken for this visit.   General appearance normal   Vitals stable   Alert, oriented and in no acute distress     0.6 cm pink scaly papule on left upper chest   Gritty papule on left forearm   Stuck on papules and brown macules on trunk and ext   Red papules on trunk  Brown papules and macules with regular pigment network and borders on torso and extremities      The remainder of skin exam is normal     Eyes: Conjunctivae/lids:Normal     ENT: Lips: normal    MSK:Normal    Cardiovascular: peripheral edema none    Pulm: Breathing Normal    Neuro/Psych: Orientation:Alert and Orientedx3 ; Mood/Affect:normal   A/P:  1. R/O ISK vs SCC on left upper chest   TANGENTIAL BIOPSY SENT OUT:  After consent, anesthesia with LEC and prep, tangential excision performed and specimen sent out for permanent section histology.  No  complications and routine wound care. Patient told to call our office in 1-2 weeks for result.      2. Actinic keratosis on left forearm x 1  LN2:  Treated with LN2 for 5s for 1-2 cycles. Warned risks of blistering, pain, pigment change, scarring, and incomplete resolution.  Advised patient to return if lesions do not completely resolve.  Wound care sheet given.  3. Seborrheic keratosis, lentigo, angioma, benign nevi   It was a pleasure speaking to Sena Flores today.  BENIGN LESIONS DISCUSSED WITH PATIENT:  I discussed the specifics of tumor, prognosis, and genetics of benign lesions.  I explained that treatment of these lesions would be purely cosmetic and not medically neccessary.  I discussed with patient different removal options including excision, cautery and /or laser.      Nature and genetics of benign skin lesions dicussed with patient.  Signs and Symptoms of skin cancer discussed with patient.  ABCDEs of melanoma reviewed with patient.  Patient encouraged to perform monthly skin exams.  UV precautions reviewed with patient.  Risks of non-melanoma skin cancer discussed with patient   Return to clinic pending biopsy results.       Again, thank you for allowing me to participate in the care of your patient.        Sincerely,        Audelia Huerta PA-C

## 2023-07-21 NOTE — PATIENT INSTRUCTIONS
Wound Care Instructions     FOR SUPERFICIAL WOUNDS     [facility] [phone]    [facility] [phone]                       AFTER 24 HOURS YOU SHOULD REMOVE THE BANDAGE AND BEGIN DAILY DRESSING CHANGES AS FOLLOWS:     1) Remove Dressing.     2) Clean and dry the area with tap water using a Q-tip or sterile gauze pad.     3) Apply Vaseline, Aquaphor, Polysporin ointment or Bacitracin ointment over entire wound.  Do NOT use Neosporin ointment.     4) Cover the wound with a band-aid, or a sterile non-stick gauze pad and micropore paper tape      REPEAT THESE INSTRUCTIONS AT LEAST ONCE A DAY UNTIL THE WOUND HAS COMPLETELY HEALED.    It is an old wives tale that a wound heals better when it is exposed to air and allowed to dry out. The wound will heal faster with a better cosmetic result if it is kept moist with ointment and covered with a bandage.    **Do not let the wound dry out.**      Supplies Needed:      *Cotton tipped applicators (Q-tips)    *Polysporin Ointment or Bacitracin Ointment (NOT NEOSPORIN)    *Band-aids or non-stick gauze pads and micropore paper tape.      PATIENT INFORMATION:    During the healing process you will notice a number of changes. All wounds develop a small halo of redness surrounding the wound.  This means healing is occurring. Severe itching with extensive redness usually indicates sensitivity to the ointment or bandage tape used to dress the wound.  You should call our office if this develops.      Swelling  and/or discoloration around your surgical site is common, particularly when performed around the eye.    All wounds normally drain.  The larger the wound the more drainage there will be.  After 7-10 days, you will notice the wound beginning to shrink and new skin will begin to grow.  The wound is healed when you can see skin has formed over the entire area.  A healed wound has a healthy, shiny look to the surface and is red to dark pink  in color to normalize.  Wounds may take approximately 4-6 weeks to heal.  Larger wounds may take 6-8 weeks.  After the wound is healed you may discontinue dressing changes.    You may experience a sensation of tightness as your wound heals. This is normal and will gradually subside.    Your healed wound may be sensitive to temperature changes. This sensitivity improves with time, but if you re having a lot of discomfort, try to avoid temperature extremes.    Patients frequently experience itching after their wound appears to have healed because of the continue healing under the skin.  Plain Vaseline will help relieve the itching.        POSSIBLE COMPLICATIONS    BLEEDING:    Leave the bandage in place.  Use tightly rolled up gauze or a cloth to apply direct pressure over the bandage for 30  minutes.  Reapply pressure for an additional 30 minutes if necessary  Use additional gauze and tape to maintain pressure once the bleeding has stopped.            WOUND CARE INSTRUCTIONS   FOR CRYOSURGERY   This area treated with liquid nitrogen should form a blister (areas treated may or may not blister-skin may just turn dark and slough off). You do not need to bandage the area unless a blister forms and breaks (which may be a few days). When the blister breaks, begin daily dressing changes as follows:   1) Clean and dry the area with tap water using clean Q-tip or sterile gauze pad.   2) Apply Polysporin ointment or Bacitracin ointment over entire wound. Do NOT use Neosporin ointment.   3) Cover the wound with a band-aid or sterile non-stick gauze pad and micropore paper tape.   REPEAT THESE INSTRUCTIONS AT LEAST ONCE A DAY UNTIL THE WOUND HAS COMPLETELY HEALED.   It is an old wives tale that a wound heals better when it is exposed to air and allowed to dry out. The wound will heal faster with a better cosmetic result if it is kept moist with ointment and covered with a bandage.   *Do not let the wound dry out.   Supplies  Needed:   *Cotton tipped applicators (Q-tips)   *Polysporin ointment or Bacitracin ointment (NOT NEOSPORIN)   *Band-aids, or non stick gauze pads and micropore paper tape   PATIENT INFORMATION   During the healing process you will notice a number of changes. All wounds develop a small halo of redness surrounding the wound. This means healing is occurring. Severe itching with extensive redness usually indicates sensitivity to the ointment or bandage tape used to dress the wound. You should call our office if this develops.   Swelling and/or discoloration around your surgical site is common, particularly when performed around the eye.   All wounds normally drain. The larger the wound the more drainage there will be. After 7-10 days, you will notice the wound beginning to shrink and new skin will begin to grow. The wound is healed when you can see skin has formed over the entire area. A healed wound has a healthy, shiny look to the surface and is red to dark pink in color to normalize. Wounds may take approximately 4-6 weeks to heal. Larger wounds may take 6-8 weeks. After the wound is healed you may discontinue dressing changes.   You may experience a sensation of tightness as your wound heals. This is normal and will gradually subside.   Your healed wound may be sensitive to temperature changes. This sensitivity improves with time, but if you re having a lot of discomfort, try to avoid temperature extremes.   Patients frequently experience itching after their wound appears to have healed because of the continue healing under the skin. Plain Vaseline will help relieve the itching.         Proper skin care from [facility]:    -Eliminate harsh soaps as they strip the natural oils from the skin, often resulting in dry itchy skin ( i.e. Dial, Zest, Danish Spring)  -Use mild soaps such as Cetaphil or Dove Sensitive Skin in the shower. You do not need to use soap on arms, legs, and trunk every time you shower  unless visibly soiled.   -Avoid hot or cold showers.  -After showering, lightly dry off and apply moisturizer within 2-3 minutes. This will help trap moisture in the skin.   -Aggressive use of a moisturizer at least 1-2 times a day to the entire body (including -Vanicream, Cetaphil, Aquaphor or Cerave) and moisturize hands after every washing.  -We recommend using moisturizers that come in a tub that needs to be scooped out, not a pump. This has more of an oil base. It will hold moisture in your skin much better than a water base moisturizer. The above recommended are non-pore clogging.      Wear a sunscreen with at least SPF 30 on your face, ears, neck and V of the chest daily. Wear sunscreen on other areas of the body if those areas are exposed to the sun throughout the day. Sunscreens can contain physical and/or chemical blockers. Physical blockers are less likely to clog pores, these include zinc oxide and titanium dioxide. Reapply every two hour and after swimming.     Sunscreen examples: https://www.ewg.org/sunscreen/    UV radiation  UVA radiation remains constant throughout the day and throughout the year. It is a longer wavelength than UVB and therefore penetrates deeper into the skin leading to immediate and delayed tanning, photoaging, and skin cancer. 70-80% of UVA and UVB radiation occurs between the hours of 10am-2pm.  UVB radiation  UVB radiation causes the most harmful effects and is more significant during the summer months. However, snow and ice can reflect UVB radiation leading to skin damage during the winter months as well. UVB radiation is responsible for tanning, burning, inflammation, delayed erythema (pinkness), pigmentation (brown spots), and skin cancer.     I recommend self monthly full body exams and yearly full body exams with a dermatology provider. If you develop a new or changing lesion please follow up for examination. Most skin cancers are pink and scaly or pink and pearly.  However, we do see blue/brown/black skin cancers.  Consider the ABCDEs of melanoma when giving yourself your monthly full body exam ( don't forget the groin, buttocks, feet, toes, etc). A-asymmetry, B-borders, C-color, D-diameter, E-elevation or evolving. If you see any of these changes please follow up in clinic. If you cannot see your back I recommend purchasing a hand held mirror to use with a larger wall mirror.

## 2023-07-24 LAB
PATH REPORT.COMMENTS IMP SPEC: NORMAL
PATH REPORT.COMMENTS IMP SPEC: NORMAL
PATH REPORT.FINAL DX SPEC: NORMAL
PATH REPORT.GROSS SPEC: NORMAL
PATH REPORT.MICROSCOPIC SPEC OTHER STN: NORMAL
PATH REPORT.RELEVANT HX SPEC: NORMAL

## 2023-09-21 DIAGNOSIS — E03.9 HYPOTHYROIDISM, UNSPECIFIED TYPE: ICD-10-CM

## 2023-09-21 DIAGNOSIS — I10 BENIGN ESSENTIAL HYPERTENSION: ICD-10-CM

## 2023-09-21 RX ORDER — LEVOTHYROXINE SODIUM 100 UG/1
100 TABLET ORAL DAILY
Qty: 30 TABLET | Refills: 5 | OUTPATIENT
Start: 2023-09-21

## 2023-09-21 RX ORDER — LEVOTHYROXINE SODIUM 100 UG/1
100 TABLET ORAL DAILY
Qty: 90 TABLET | Refills: 0 | Status: SHIPPED | OUTPATIENT
Start: 2023-09-21 | End: 2023-09-28

## 2023-09-21 NOTE — TELEPHONE ENCOUNTER
Chanelle refill given x 1, has follow up appointment scheduled 9*/28/23 with Dr. Genao.    Prescription approved per [facility] Refill Protocol.  Julie Behrendt RN

## 2023-09-21 NOTE — TELEPHONE ENCOUNTER
Overdue for needed care. Please call to schedule annual wellness exam. Once appt is scheduled, route back to the pool.    Julie Behrendt RN

## 2023-09-28 ENCOUNTER — OFFICE VISIT (OUTPATIENT)
Dept: FAMILY MEDICINE | Facility: CLINIC | Age: 88
End: 2023-09-28
Payer: COMMERCIAL

## 2023-09-28 VITALS
HEART RATE: 73 BPM | BODY MASS INDEX: 30.53 KG/M2 | RESPIRATION RATE: 20 BRPM | DIASTOLIC BLOOD PRESSURE: 78 MMHG | HEIGHT: 66 IN | SYSTOLIC BLOOD PRESSURE: 134 MMHG | TEMPERATURE: 97.9 F | WEIGHT: 190 LBS | OXYGEN SATURATION: 96 %

## 2023-09-28 DIAGNOSIS — Z12.31 ENCOUNTER FOR SCREENING MAMMOGRAM FOR MALIGNANT NEOPLASM OF BREAST: ICD-10-CM

## 2023-09-28 DIAGNOSIS — Z00.00 ENCOUNTER FOR MEDICARE ANNUAL WELLNESS EXAM: Primary | ICD-10-CM

## 2023-09-28 DIAGNOSIS — I10 BENIGN ESSENTIAL HYPERTENSION: ICD-10-CM

## 2023-09-28 DIAGNOSIS — E78.2 MIXED HYPERLIPIDEMIA: ICD-10-CM

## 2023-09-28 DIAGNOSIS — E03.9 HYPOTHYROIDISM, UNSPECIFIED TYPE: ICD-10-CM

## 2023-09-28 DIAGNOSIS — R53.83 FATIGUE, UNSPECIFIED TYPE: ICD-10-CM

## 2023-09-28 PROCEDURE — 99397 PER PM REEVAL EST PAT 65+ YR: CPT | Performed by: FAMILY MEDICINE

## 2023-09-28 PROCEDURE — 99214 OFFICE O/P EST MOD 30 MIN: CPT | Mod: 25 | Performed by: FAMILY MEDICINE

## 2023-09-28 RX ORDER — AMLODIPINE BESYLATE 2.5 MG/1
2.5 TABLET ORAL DAILY
Qty: 90 TABLET | Refills: 3 | Status: SHIPPED | OUTPATIENT
Start: 2023-09-28 | End: 2024-09-30

## 2023-09-28 RX ORDER — HYDROCHLOROTHIAZIDE 12.5 MG/1
12.5 TABLET ORAL DAILY
Qty: 90 TABLET | Refills: 3 | Status: SHIPPED | OUTPATIENT
Start: 2023-09-28 | End: 2024-09-30

## 2023-09-28 RX ORDER — MELOXICAM 7.5 MG/1
7.5 TABLET ORAL DAILY
Qty: 15 TABLET | Refills: 0 | Status: CANCELLED | OUTPATIENT
Start: 2023-09-28

## 2023-09-28 RX ORDER — LEVOTHYROXINE SODIUM 100 UG/1
100 TABLET ORAL DAILY
Qty: 90 TABLET | Refills: 0 | Status: SHIPPED | OUTPATIENT
Start: 2023-09-28 | End: 2023-11-21

## 2023-09-28 ASSESSMENT — ENCOUNTER SYMPTOMS
CONSTIPATION: 1
MYALGIAS: 1
NAUSEA: 0
BREAST MASS: 0
DYSURIA: 0
JOINT SWELLING: 0
EYE PAIN: 0
PARESTHESIAS: 1
SHORTNESS OF BREATH: 1
CHILLS: 0
DIARRHEA: 0
HEARTBURN: 0
PALPITATIONS: 0
SORE THROAT: 0
HEMATURIA: 0
WEAKNESS: 1
ARTHRALGIAS: 1
HEMATOCHEZIA: 0
FREQUENCY: 1
NERVOUS/ANXIOUS: 0
FEVER: 0
HEADACHES: 0
DIZZINESS: 1
COUGH: 0

## 2023-09-28 ASSESSMENT — PAIN SCALES - GENERAL: PAINLEVEL: NO PAIN (0)

## 2023-09-28 ASSESSMENT — ACTIVITIES OF DAILY LIVING (ADL): CURRENT_FUNCTION: NO ASSISTANCE NEEDED

## 2023-09-28 NOTE — PATIENT INSTRUCTIONS
Schedule fasting lab appointment at your soonest convenience.    To schedule the screening mammogram, call [phone].   After our discussion, you deferred colon cancer screening.    Reconsider completing your immunizations to protect yourself from severe illness, and help prevent spread of infectious diseases.    Be consistent with low salt, low trans fat and low saturated fat diet.  Eat food rich in omega-3-fatty acids as you tolerate. (salmon, olive oil)  Eat 5 cups of vegetables, fruits and whole grains per day.  Limit starchy food (white rice, white bread, white pasta, white potatoes) to less than a cup per meal.  Minimize sweets, junk food and fastfood. Limit soda beverages to one serving per day; best to avoid it altogether though.    Exercise: moderate intensity sustained for at least 30 mins per episode, goal of 150 mins per week at least  Combine cardiovascular and resistance exercises.  These exercise recommendations are in addition to your daily activity at work or home.  Work on losing weight.    Preventative Care Visits include: Yearly physicals, Well-child visits, Welcome to Medicare visits, & Medicare yearly wellness exams.    The purpose of these visits is to discuss your medical history and prevent health problems before you are sick.  You may need to pay a copay, coinsurance or deductible if your visit today includes testing or treating for a new or existing condition.    Additional charges may be incurred for today's visit. If you have questions about what your insurance plan covers, please contact your Insurance Company's member service department.  If you have questions specific to a bill you have already received from [facility], please contact the [facility]illing Office at [phone].        Patient Education   Personalized Prevention Plan  You are due for the preventive services outlined below.  Your care team is available to assist you in scheduling these services.  If you have already  completed any of these items, please share that information with your care team to update in your medical record.  Health Maintenance Due   Topic Date Due    Zoster (Shingles) Vaccine (1 of 2) Never done    Pneumococcal Vaccine (1 - PCV) Never done    COVID-19 Vaccine (4 - Pfizer series) 06/25/2022    Thyroid Function Lab  08/15/2023    ANNUAL REVIEW OF HM ORDERS  08/15/2023    Flu Vaccine (1) 09/01/2023     Preventive Health Recommendations    See your health care provider every year to  Review health changes.   Discuss preventive care.    Review your medicines if your doctor has prescribed any.  You no longer need a yearly Pap test unless you've had an abnormal Pap test in the past 10 years. If you have vaginal symptoms, such as bleeding or discharge, be sure to talk with your provider about a Pap test.  Every 1 to 2 years, have a mammogram.  If you are over 69, talk with your health care provider about whether or not you want to continue having screening mammograms.  Every 10 years, have a colonoscopy. Or, have a yearly FIT test (stool test). These exams will check for colon cancer.   Have a cholesterol test every 5 years, or more often if your doctor advises it.   Have a diabetes test (fasting glucose) every three years. If you are at risk for diabetes, you should have this test more often.   At age 65, have a bone density scan (DEXA) to check for osteoporosis (brittle bone disease).    Shots:  Get a flu shot each year.  Get a tetanus shot every 10 years.  Talk to your doctor about your pneumonia vaccines. There are now two you should receive - Pneumovax (PPSV 23) and Prevnar (PCV 13).  Talk to your pharmacist about the shingles vaccine.  Talk to your doctor about the hepatitis B vaccine.    Nutrition:   Eat at least 5 servings of fruits and vegetables each day.  Eat whole-grain bread, whole-wheat pasta and brown rice instead of white grains and rice.  Get adequate Calcium and Vitamin D.     Lifestyle  Exercise  "at least 150 minutes a week (30 minutes a day, 5 days a week). This will help you control your weight and prevent disease.  Limit alcohol to one drink per day.  No smoking.   Wear sunscreen to prevent skin cancer.   See your dentist twice a year for an exam and cleaning.  See your eye doctor every 1 to 2 years to screen for conditions such as glaucoma, macular degeneration and cataracts.    Personalized Prevention Plan  You are due for the preventive services outlined below.  Your care team is available to assist you in scheduling these services.  If you have already completed any of these items, please share that information with your care team to update in your medical record.  Health Maintenance   Topic Date Due    ZOSTER IMMUNIZATION (1 of 2) Never done    Pneumococcal Vaccine: 65+ Years (1 - PCV) Never done    COVID-19 Vaccine (4 - Pfizer series) 06/25/2022    TSH W/FREE T4 REFLEX  08/15/2023    ANNUAL REVIEW OF HM ORDERS  08/15/2023    INFLUENZA VACCINE (1) 09/01/2023    MEDICARE ANNUAL WELLNESS VISIT  09/28/2024    FALL RISK ASSESSMENT  09/28/2024    ADVANCE CARE PLANNING  09/28/2028    DTAP/TDAP/TD IMMUNIZATION (2 - Td or Tdap) 08/13/2029    PHQ-2 (once per calendar year)  Completed    IPV IMMUNIZATION  Aged Out    HPV IMMUNIZATION  Aged Out    MENINGITIS IMMUNIZATION  Aged Out     Learning About Being Physically Active  What is physical activity?     Being physically active means doing any kind of activity that gets your body moving.  The types of physical activity that can help you get fit and stay healthy include:  Aerobic or \"cardio\" activities. These make your heart beat faster and make you breathe harder, such as brisk walking, riding a bike, or running. They strengthen your heart and lungs and build up your endurance.  Strength training activities. These make your muscles work against, or \"resist,\" something. Examples include lifting weights or doing push-ups. These activities help tone and strengthen " your muscles and bones.  Stretches. These let you move your joints and muscles through their full range of motion. Stretching helps you be more flexible.  Reaching a balance between these three types of physical activity is important because each one contributes to your overall fitness.  What are the benefits of being active?  Being active is one of the best things you can do for your health. It helps you to:  Feel stronger and have more energy to do all the things you like to do.  Focus better at school or work.  Feel, think, and sleep better.  Reach and stay at a healthy weight.  Lose fat and build lean muscle.  Lower your risk for serious health problems, including diabetes, heart attack, high blood pressure, and some cancers.  Keep your heart, lungs, bones, muscles, and joints strong and healthy.  How can you make being active part of your life?  Start slowly. Make it your long-term goal to get at least 30 minutes of exercise on most days of the week. Walking is a good choice. You also may want to do other activities, such as running, swimming, cycling, or playing tennis or team sports.  Pick activities that you like--ones that make your heart beat faster, your muscles stronger, and your muscles and joints more flexible. If you find more than one thing you like doing, do them all. You don't have to do the same thing every day.  Get your heart pumping every day. Any activity that makes your heart beat faster and keeps it at that rate for a while counts.  Here are some great ways to get your heart beating faster:  Go for a brisk walk, run, or bike ride.  Go for a hike or swim.  Go in-line skating.  Play a game of touch football, basketball, or soccer.  Ride a bike.  Play tennis or racquetball.  Climb stairs.  Even some household chores can be aerobic--just do them at a faster pace. Vacuuming, raking or mowing the lawn, sweeping the garage, and washing and waxing the car all can help get your heart rate  "up.  Strengthen your muscles during the week. You don't have to lift heavy weights or grow big, bulky muscles to get stronger. Doing a few simple activities that make your muscles work against, or \"resist,\" something can help you get stronger.  For example, you can:  Do push-ups or sit-ups, which use your own body weight as resistance.  Lift weights or dumbbells or use stretch bands at home or in a gym or community center.  Stretch your muscles often. Stretching will help you as you become more active. It can help you stay flexible, loosen tight muscles, and avoid injury. It can also help improve your balance and posture and can be a great way to relax.  Be sure to stretch the muscles you'll be using when you work out. It's best to warm your muscles slightly before you stretch them. Walk or do some other light aerobic activity for a few minutes, and then start stretching.  When you stretch your muscles:  Do it slowly. Stretching is not about going fast or making sudden movements.  Don't push or bounce during a stretch.  Hold each stretch for at least 15 to 30 seconds, if you can. You should feel a stretch in the muscle, but not pain.  Breathe out as you do the stretch. Then breathe in as you hold the stretch. Don't hold your breath.  If you're worried about how more activity might affect your health, have a checkup before you start. Follow any special advice your doctor gives you for getting a smart start.  Where can you learn more?  Go to https://www.[employer].net/patiented  Enter W332 in the search box to learn more about \"Learning About Being Physically Active.\"  Current as of: October 10, 2022               Content Version: 13.7    5299-5940 [employer].   Care instructions adapted under license by your healthcare professional. If you have questions about a medical condition or this instruction, always ask your healthcare professional. [employer] disclaims any warranty or liability " for your use of this information.      Learning About Dietary Guidelines  What are the Dietary Guidelines for Americans?     Dietary Guidelines for Americans provide tips for eating well and staying healthy. This helps reduce the risk for long-term (chronic) diseases.  These guidelines recommend that you:  Eat and drink the right amount for you. The [place] government's food guide is called MyPlate. It can help you make your own well-balanced eating plan.  Try to balance your eating with your activity. This helps you stay at a healthy weight.  Drink alcohol in moderation, if at all.  Limit foods high in salt, saturated fat, trans fat, and added sugar.  These guidelines are from the U.[place]. Department of Agriculture and the U.[place] Department of Health and Human Services. They are updated every 5 years.  What is MyPlate?  MyPlate is the [place] government's food guide. It can help you make your own well-balanced eating plan. A balanced eating plan means that you eat enough, but not too much, and that your food gives you the nutrients you need to stay healthy.  MyPlate focuses on eating plenty of whole grains, fruits, and vegetables, and on limiting fat and sugar. It is available online at www.ChooseMyPlate.gov.  How can you get started?  If you're trying to eat healthier, you can slowly change your eating habits over time. You don't have to make big changes all at once. Start by adding one or two healthy foods to your meals each day.  Grains  Choose whole-grain breads and cereals and whole-wheat pasta and whole-grain crackers.  Vegetables  Eat a variety of vegetables every day. They have lots of nutrients and are part of a heart-healthy diet.  Fruits  Eat a variety of fruits every day. Fruits contain lots of nutrients. Choose fresh fruit instead of fruit juice.  Protein foods  Choose fish and lean poultry more often. Eat red meat and fried meats less often. Dried beans, tofu, and nuts are also good sources of  "protein.  Dairy  Choose low-fat or fat-free products from this food group. If you have problems digesting milk, try eating cheese or yogurt instead.  Fats and oils  Limit fats and oils if you're trying to cut calories. Choose healthy fats when you cook. These include canola oil and olive oil.  Where can you learn more?  Go to https://www.[employer].net/patiented  Enter D676 in the search box to learn more about \"Learning About Dietary Guidelines.\"  Current as of: March 1, 2023               Content Version: 13.7    6158-1859 [employer].   Care instructions adapted under license by your healthcare professional. If you have questions about a medical condition or this instruction, always ask your healthcare professional. [employer] disclaims any warranty or liability for your use of this information.      Hearing Loss: Care Instructions  Overview     Hearing loss is a sudden or slow decrease in how well you hear. It can range from slight to profound. Permanent hearing loss can occur with aging. It also can happen when you are exposed long-term to loud noise. Examples include listening to loud music, riding motorcycles, or being around other loud machines.  Hearing loss can affect your work and home life. It can make you feel lonely or depressed. You may feel that you have lost your independence. But hearing aids and other devices can help you hear better and feel connected to others.  Follow-up care is a key part of your treatment and safety. Be sure to make and go to all appointments, and call your doctor if you are having problems. It's also a good idea to know your test results and keep a list of the medicines you take.  How can you care for yourself at home?  Avoid loud noises whenever possible. This helps keep your hearing from getting worse.  Always wear hearing protection around loud noises.  Wear a hearing aid as directed.  A professional can help you pick a hearing aid that will " "work best for you.  You can also get hearing aids over the counter for mild to moderate hearing loss.  Have hearing tests as your doctor suggests. They can show whether your hearing has changed. Your hearing aid may need to be adjusted.  Use other devices as needed. These may include:  Telephone amplifiers and hearing aids that can connect to a television, stereo, radio, or microphone.  Devices that use lights or vibrations. These alert you to the doorbell, a ringing telephone, or a baby monitor.  Television closed-captioning. This shows the words at the bottom of the screen. Most new TVs can do this.  TTY (text telephone). This lets you type messages back and forth on the telephone instead of talking or listening. These devices are also called TDD. When messages are typed on the keyboard, they are sent over the phone line to a receiving TTY. The message is shown on a monitor.  Use text messaging, social media, and email if it is hard for you to communicate by telephone.  Try to learn a listening technique called speechreading. It is not lipreading. You pay attention to people's gestures, expressions, posture, and tone of voice. These clues can help you understand what a person is saying. Face the person you are talking to, and have them face you. Make sure the lighting is good. You need to see the other person's face clearly.  Think about counseling if you need help to adjust to your hearing loss.  When should you call for help?  Watch closely for changes in your health, and be sure to contact your doctor if:    You think your hearing is getting worse.     You have new symptoms, such as dizziness or nausea.   Where can you learn more?  Go to https://www.[employer].net/patiented  Enter R798 in the search box to learn more about \"Hearing Loss: Care Instructions.\"  Current as of: March 1, 2023               Content Version: 13.7    5222-6788 [employer].   Care instructions adapted under license by your " healthcare professional. If you have questions about a medical condition or this instruction, always ask your healthcare professional. [employer], [facility] disclaims any warranty or liability for your use of this information.      Bladder Training: Care Instructions  Your Care Instructions     Bladder training is used to treat urge incontinence and stress incontinence. Urge incontinence means that the need to urinate comes on so fast that you can't get to a toilet in time. Stress incontinence means that you leak urine because of pressure on your bladder. For example, it may happen when you laugh, cough, or lift something heavy.  Bladder training can increase how long you can wait before you have to urinate. It can also help your bladder hold more urine. And it can give you better control over the urge to urinate.  It is important to remember that bladder training takes a few weeks to a few months to make a difference. You may not see results right away, but don't give up.  Follow-up care is a key part of your treatment and safety. Be sure to make and go to all appointments, and call your doctor if you are having problems. It's also a good idea to know your test results and keep a list of the medicines you take.  How can you care for yourself at home?  Work with your doctor to come up with a bladder training program that is right for you. You may use one or more of the following methods.  Delayed urination  In the beginning, try to keep from urinating for 5 minutes after you first feel the need to go.  While you wait, take deep, slow breaths to relax. Kegel exercises can also help you delay the need to go to the bathroom.  After some practice, when you can easily wait 5 minutes to urinate, try to wait 10 minutes before you urinate.  Slowly increase the waiting period until you are able to control when you have to urinate.  Scheduled urination  Empty your bladder when you first wake up in the morning.  Schedule  "times throughout the day when you will urinate.  Start by going to the bathroom every hour, even if you don't need to go.  Slowly increase the time between trips to the bathroom.  When you have found a schedule that works well for you, keep doing it.  If you wake up during the night and have to urinate, do it. Apply your schedule to waking hours only.  Kegel exercises  These tighten and strengthen pelvic muscles, which can help you control the flow of urine. (If doing these exercises causes pain, stop doing them and talk with your doctor.) To do Kegel exercises:  Squeeze your muscles as if you were trying not to pass gas. Or squeeze your muscles as if you were stopping the flow of urine. Your belly, legs, and buttocks shouldn't move.  Hold the squeeze for 3 seconds, then relax for 5 to 10 seconds.  Start with 3 seconds, then add 1 second each week until you are able to squeeze for 10 seconds.  Repeat the exercise 10 times a session. Do 3 to 8 sessions a day.  When should you call for help?  Watch closely for changes in your health, and be sure to contact your doctor if:    Your incontinence is getting worse.     You do not get better as expected.   Where can you learn more?  Go to https://www.[employer].net/patiented  Enter V684 in the search box to learn more about \"Bladder Training: Care Instructions.\"  Current as of: March 1, 2023               Content Version: 13.7    8637-7874 [employer].   Care instructions adapted under license by your healthcare professional. If you have questions about a medical condition or this instruction, always ask your healthcare professional. [employer] disclaims any warranty or liability for your use of this information.         "

## 2023-09-28 NOTE — PROGRESS NOTES
"SUBJECTIVE:   Sena is a 88 year old who presents for Preventive Visit.      9/28/2023     4:09 PM   Additional Questions   Roomed by Jeanine RODRIGUEZ MA   Accompanied by Self         9/28/2023     4:09 PM   Patient Reported Additional Medications   Patient reports taking the following new medications None       Are you in the first 12 months of your Medicare coverage?  No    Healthy Habits:     In general, how would you rate your overall health?  Good    Frequency of exercise:  None    Do you usually eat at least 4 servings of fruit and vegetables a day, include whole grains    & fiber and avoid regularly eating high fat or \"junk\" foods?  No    Taking medications regularly:  Yes    Medication side effects:  None, Muscle aches and Lightheadedness    Ability to successfully perform activities of daily living:  No assistance needed    Home Safety:  Throw rugs in the hallway    Hearing Impairment:  Difficulty following a conversation in a noisy restaurant or crowded room and difficulty understanding soft or whispered speech    In the past 6 months, have you been bothered by leaking of urine? Yes    In general, how would you rate your overall mental or emotional health?  Good    Additional concerns today:  Yes      Today's PHQ-2 Score:       9/28/2023     3:52 PM   PHQ-2 ( 1999 [employer])   Q1: Little interest or pleasure in doing things 0   Q2: Feeling down, depressed or hopeless 0   PHQ-2 Score 0   Q1: Little interest or pleasure in doing things Not at all   Q2: Feeling down, depressed or hopeless Not at all   PHQ-2 Score 0     Have you ever done Advance Care Planning? (For example, a Health Directive, POLST, or a discussion with a medical provider or your loved ones about your wishes): Yes, patient states has an Advance Care Planning document and will bring a copy to the clinic.      Fall risk  Fallen 2 or more times in the past year?: No  Any fall with injury in the past year?: No  click delete button to remove this line " now  Cognitive Screening   1) Repeat 3 items (Leader, Season, Table)    2) Clock draw: NORMAL  3) 3 item recall: Recalls 2 objects   Results: NORMAL clock, 1-2 items recalled: COGNITIVE IMPAIRMENT LESS LIKELY    Mini-CogTM Copyright LEWIS Moore. Licensed by the author for use in [facility]; reprinted with permission (yoly@[facility]). All rights reserved.      Do you have sleep apnea, excessive snoring or daytime drowsiness? : no    Reviewed and updated as needed this visit by clinical staff   Tobacco  Allergies  Meds              Reviewed and updated as needed this visit by Provider                 Social History     Tobacco Use    Smoking status: Former     Types: Cigarettes    Smokeless tobacco: Never   Substance Use Topics    Alcohol use: No     Comment: rare beer             9/28/2023     3:51 PM   Alcohol Use   Prescreen: >3 drinks/day or >7 drinks/week? No          No data to display              Do you have a current opioid prescription? No  Do you use any other controlled substances or medications that are not prescribed by a provider? None          Current providers sharing in care for this patient include:   Patient Care Team:  Neno Genao MD as PCP - General (Family Practice)  Neno Genao MD as Assigned PCP  Audelia Guevara PA-C as Physician Assistant (Dermatology)    The following health maintenance items are reviewed in Epic and correct as of today:  Health Maintenance   Topic Date Due    ZOSTER IMMUNIZATION (1 of 2) Never done    Pneumococcal Vaccine: 65+ Years (1 - PCV) Never done    COVID-19 Vaccine (4 - Pfizer series) 06/25/2022    MEDICARE ANNUAL WELLNESS VISIT  08/15/2023    TSH W/FREE T4 REFLEX  08/15/2023    ANNUAL REVIEW OF HM ORDERS  08/15/2023    INFLUENZA VACCINE (1) 09/01/2023    FALL RISK ASSESSMENT  09/28/2024    ADVANCE CARE PLANNING  08/15/2027    DTAP/TDAP/TD IMMUNIZATION (2 - Td or Tdap) 08/13/2029    PHQ-2 (once per calendar year)   Completed    IPV IMMUNIZATION  Aged Out    HPV IMMUNIZATION  Aged Out    MENINGITIS IMMUNIZATION  Aged Out     Patient Active Problem List   Diagnosis    Vaccine refused by patient    Problem with medical care compliance    Uncontrolled hypertension    Benign essential hypertension    Dyslipidemia (high LDL; low HDL)    Hypothyroidism, unspecified type     Past Surgical History:   Procedure Laterality Date    APPENDECTOMY      COLONOSCOPY         Social History     Tobacco Use    Smoking status: Former     Types: Cigarettes    Smokeless tobacco: Never   Substance Use Topics    Alcohol use: No     Comment: rare beer     Family History   Problem Relation Age of Onset    Hypertension Mother     Coronary Artery Disease Mother     Colon Cancer Brother     Other Cancer Brother     Cerebrovascular Disease Maternal Grandmother     Coronary Artery Disease Maternal Grandfather     Alzheimer Disease Paternal Grandmother     Thyroid Disease Sister     Other Cancer Brother         lung    Rheumatoid Arthritis Sister          Current Outpatient Medications   Medication Sig Dispense Refill    amLODIPine (NORVASC) 2.5 MG tablet Take 1 tablet (2.5 mg) by mouth daily 90 tablet 3    hydrochlorothiazide (HYDRODIURIL) 12.5 MG tablet Take 1 tablet (12.5 mg) by mouth daily 90 tablet 3    levothyroxine (SYNTHROID/LEVOTHROID) 100 MCG tablet Take 1 tablet (100 mcg) by mouth daily 90 tablet 0     Allergies   Allergen Reactions    Pcn [Penicillins]      Patient has declined all vaccinations at this time.    Mammogram Screening - Patient over age 75, has elected to continue with screening.  Pertinent mammograms are reviewed under the imaging tab.    Review of Systems   Constitutional:  Negative for chills and fever.   HENT:  Positive for hearing loss. Negative for congestion, ear pain and sore throat.    Eyes:  Positive for visual disturbance. Negative for pain.   Respiratory:  Positive for shortness of breath. Negative for cough.   "  Cardiovascular:  Negative for chest pain, palpitations and peripheral edema.   Gastrointestinal:  Positive for constipation. Negative for diarrhea, heartburn, hematochezia and nausea.   Breasts:  Negative for tenderness, breast mass and discharge.   Genitourinary:  Positive for frequency and urgency. Negative for dysuria, genital sores, hematuria, pelvic pain, vaginal bleeding and vaginal discharge.   Musculoskeletal:  Positive for arthralgias and myalgias. Negative for joint swelling.   Skin:  Negative for rash.   Neurological:  Positive for dizziness, weakness and paresthesias. Negative for headaches.   Psychiatric/Behavioral:  Negative for mood changes. The patient is not nervous/anxious.      Patient states only symptoms she is concerned at this time is her mild fatigue.  ]  OBJECTIVE:   /78 (BP Location: Left arm, Patient Position: Sitting, Cuff Size: Adult Regular)   Pulse 73   Temp 97.9  F (36.6  C) (Tympanic)   Resp 20   Ht 1.676 m (5' 6\")   Wt 86.2 kg (190 lb)   SpO2 96%   BMI 30.67 kg/m   Estimated body mass index is 30.67 kg/m  as calculated from the following:    Height as of this encounter: 1.676 m (5' 6\").    Weight as of this encounter: 86.2 kg (190 lb).  Physical Exam  GENERAL APPEARANCE: obese, ambulatory w/o assist, alert and no distress  EYES: pink conj, no icterus, PERRL, EOMI  HENT: ear canals and TM's normal, nose and mouth without ulcers or lesions, oropharynx clear and oral mucous membranes moist  NECK: no adenopathy, no asymmetry, masses, or scars and thyroid normal to palpation  RESP: lungs clear to auscultation - no rales, rhonchi or wheezes  CV: regular rates and rhythm, normal S1 S2, no S3 or S4, no murmur, click or rub, no peripheral edema and peripheral pulses strong  ABDOMEN: soft, nontender, no hepatosplenomegaly, no masses and bowel sounds normal  RECTAL: normal sphincter tone, no rectal masses, prostate slightly enlarged but smooth, soft and nontender  MS: no " musculoskeletal defects are noted and gait is age appropriate without ataxia  SKIN: no suspicious lesions or rashes  NEURO: Normal strength and tone, sensory exam grossly normal, mentation intact and speech normal   PSYCH: well-dressed, linearw thoughts, tearful when she recalls or talks abput her spouse's death; but overall normal mood otherwise.    Diagnostic Test Results:  none     ASSESSMENT / PLAN:   Sena was seen today for physical.    Diagnoses and all orders for this visit:    Encounter for Medicare annual wellness exam  Patient was advised on recommended screening and preventive health recommendations.  He verbalized understanding and agreed to the plans below.   She declines vaccinations.    Benign essential hypertension  -     hydrochlorothiazide (HYDRODIURIL) 12.5 MG tablet; Take 1 tablet (12.5 mg) by mouth daily  -     amLODIPine (NORVASC) 2.5 MG tablet; Take 1 tablet (2.5 mg) by mouth daily  -     OFFICE/OUTPT VISIT,EST,LEVL IV  Controlled.  Low salt, low fat diet.   Exercise as tolerated.  Take meds as prescribed; call if with side effects.      Hypothyroidism, unspecified type  -     levothyroxine (SYNTHROID/LEVOTHROID) 100 MCG tablet; Take 1 tablet (100 mcg) by mouth daily  -     OFFICE/OUTPT VISIT,EST,LEVL IV  Check labs and adjust med as appropriate.    Mixed hyperlipidemia  -     Lipid panel reflex to direct LDL Fasting; Future  -     OFFICE/OUTPT VISIT,EST,LEVL IV  Patient will schedule fasting lab appointment.    Encounter for screening mammogram for malignant neoplasm of breast  -     *MA Screening Digital Bilateral; Future    Fatigue, unspecified type  -     Comprehensive metabolic panel; Future  -     CBC with Platelets & Differential; Future  -     TSH with free T4 reflex; Future  -     Vitamin D Deficiency; Future  -     Vitamin B12; Future  -     OFFICE/OUTPT VISIT,EST,LEVL IV  Patient states she just feels overall tired.  Check labs as above.  Consider mood disorder as etiology if with  "increasing depression.     Other orders  -     REVIEW OF HEALTH MAINTENANCE PROTOCOL ORDERS  -     PRIMARY CARE FOLLOW-UP SCHEDULING; Future        Patient has been advised of split billing requirements and indicates understanding: Yes      COUNSELING:  Reviewed preventive health counseling, as reflected in patient instructions      BMI:   Estimated body mass index is 30.67 kg/m  as calculated from the following:    Height as of this encounter: 1.676 m (5' 6\").    Weight as of this encounter: 86.2 kg (190 lb).   Weight management plan: Discussed healthy diet and exercise guidelines      She reports that she has quit smoking. Her smoking use included cigarettes. She has never used smokeless tobacco.      Appropriate preventive services were discussed with this patient, including applicable screening as appropriate for fall prevention, nutrition, physical activity, Tobacco-use cessation, weight loss and cognition.  Checklist reviewing preventive services available has been given to the patient.    Reviewed patients plan of care and provided an AVS. The Basic Care Plan (routine screening as documented in Health Maintenance) and Complex Care Plan (for patients with higher acuity and needing more deliberate coordination of services) for Sena meets the Care Plan requirement. This Care Plan has been established and reviewed with the Patient.          Neno Genao MD  [facility]    Identified Health Risks:  I have reviewed Opioid Use Disorder and Substance Use Disorder risk factors and made any needed referrals.   "

## 2023-09-28 NOTE — PROGRESS NOTES
She is at risk for lack of exercise and has been provided with information to increase physical activity for the benefit of her well-being.  The patient was counseled and encouraged to consider modifying their diet and eating habits. She was provided with information on recommended healthy diet options.  The patient was provided with written information regarding signs of hearing loss.  Information on urinary incontinence and treatment options given to patient.

## 2023-10-04 ENCOUNTER — HOSPITAL ENCOUNTER (OUTPATIENT)
Dept: MAMMOGRAPHY | Facility: CLINIC | Age: 88
Discharge: HOME | End: 2023-10-04
Attending: FAMILY MEDICINE | Admitting: FAMILY MEDICINE
Payer: COMMERCIAL

## 2023-10-04 DIAGNOSIS — Z12.31 ENCOUNTER FOR SCREENING MAMMOGRAM FOR MALIGNANT NEOPLASM OF BREAST: ICD-10-CM

## 2023-10-04 PROCEDURE — 77067 SCR MAMMO BI INCL CAD: CPT

## 2023-11-21 DIAGNOSIS — E03.9 HYPOTHYROIDISM, UNSPECIFIED TYPE: ICD-10-CM

## 2023-11-21 RX ORDER — LEVOTHYROXINE SODIUM 100 UG/1
100 TABLET ORAL DAILY
Qty: 90 TABLET | Refills: 0 | Status: SHIPPED | OUTPATIENT
Start: 2023-11-21 | End: 2024-02-21

## 2024-02-20 DIAGNOSIS — E03.9 HYPOTHYROIDISM, UNSPECIFIED TYPE: ICD-10-CM

## 2024-02-21 RX ORDER — LEVOTHYROXINE SODIUM 100 UG/1
TABLET ORAL
Qty: 30 TABLET | Refills: 2 | Status: SHIPPED | OUTPATIENT
Start: 2024-02-21 | End: 2024-05-16

## 2024-02-21 NOTE — TELEPHONE ENCOUNTER
Pending Prescriptions:                       Disp   Refills    levothyroxine (SYNTHROID/LEVOTHROID) 100 *30 tab*2            Sig: TAKE 1 TABLET BY MOUTH EVERY DAY (MD NOTE: MUST           HAVE THYROID LABS FOR FURTHER REFILLS)    Routing refill request to provider for review/approval because:  TSH   Date Value Ref Range Status   08/15/2022 0.54 0.40 - 4.00 mU/L Final   09/25/2018 10.73 (H) 0.40 - 4.50 mIU/L Final     Cong Cabrera RN

## 2024-03-26 DIAGNOSIS — E03.9 HYPOTHYROIDISM, UNSPECIFIED TYPE: ICD-10-CM

## 2024-03-26 RX ORDER — LEVOTHYROXINE SODIUM 100 UG/1
TABLET ORAL
Qty: 90 TABLET | Refills: 1 | OUTPATIENT
Start: 2024-03-26

## 2024-03-26 NOTE — TELEPHONE ENCOUNTER
LM on patient VM, rx denied, need to schedule lab appt in order to have change in med. Destini Harris on 3/26/2024 at 3:28 PM

## 2024-03-29 NOTE — TELEPHONE ENCOUNTER
Patient currently in [place], will be home May 6-7, and able to do labs at that time.     Could 2 months be fill until patient's return.     Lab appt scheduled for 05/14 at [facility]  Next 5 appointments (look out 90 days)      May 16, 2024  1:30 PM  (Arrive by 1:10 PM)  Provider Visit with Neno Genao MD  [facility] ([facility] )  Arrive at: [facility] [address] [facility] [address]  [phone]

## 2024-05-04 ENCOUNTER — TRANSFERRED RECORDS (OUTPATIENT)
Dept: MULTI SPECIALTY CLINIC | Facility: CLINIC | Age: 89
End: 2024-05-04
Payer: COMMERCIAL

## 2024-05-09 ENCOUNTER — TELEPHONE (OUTPATIENT)
Dept: FAMILY MEDICINE | Facility: CLINIC | Age: 89
End: 2024-05-09
Payer: COMMERCIAL

## 2024-05-09 NOTE — TELEPHONE ENCOUNTER
The patient called to report she was in the hospital down in [place]. The patient states she was in for low potassium.  The patient was discharged. She will need further labs. The patient has appt scheduled with provider.  The patient reports she does not feel that well now. She will go to the local clinic to be seen and evaluated before she heads home.     Thank you    Annabelle GOODSON RN

## 2024-05-14 ENCOUNTER — LAB (OUTPATIENT)
Dept: LAB | Facility: CLINIC | Age: 89
End: 2024-05-14
Payer: COMMERCIAL

## 2024-05-14 DIAGNOSIS — E78.2 MIXED HYPERLIPIDEMIA: ICD-10-CM

## 2024-05-14 DIAGNOSIS — R53.83 FATIGUE, UNSPECIFIED TYPE: ICD-10-CM

## 2024-05-14 LAB
ALBUMIN SERPL BCG-MCNC: 4.5 G/DL (ref 3.5–5.2)
ALP SERPL-CCNC: 68 U/L (ref 40–150)
ALT SERPL W P-5'-P-CCNC: 12 U/L (ref 0–50)
ANION GAP SERPL CALCULATED.3IONS-SCNC: 11 MMOL/L (ref 7–15)
AST SERPL W P-5'-P-CCNC: 20 U/L (ref 0–45)
BASOPHILS # BLD AUTO: 0.1 10E3/UL (ref 0–0.2)
BASOPHILS NFR BLD AUTO: 1 %
BILIRUB SERPL-MCNC: 0.6 MG/DL
BUN SERPL-MCNC: 20 MG/DL (ref 8–23)
CALCIUM SERPL-MCNC: 9.6 MG/DL (ref 8.8–10.2)
CHLORIDE SERPL-SCNC: 102 MMOL/L (ref 98–107)
CHOLEST SERPL-MCNC: 263 MG/DL
CREAT SERPL-MCNC: 1 MG/DL (ref 0.51–0.95)
DEPRECATED HCO3 PLAS-SCNC: 27 MMOL/L (ref 22–29)
EGFRCR SERPLBLD CKD-EPI 2021: 54 ML/MIN/1.73M2
EOSINOPHIL # BLD AUTO: 0.3 10E3/UL (ref 0–0.7)
EOSINOPHIL NFR BLD AUTO: 5 %
ERYTHROCYTE [DISTWIDTH] IN BLOOD BY AUTOMATED COUNT: 12.5 % (ref 10–15)
FASTING STATUS PATIENT QL REPORTED: YES
FASTING STATUS PATIENT QL REPORTED: YES
GLUCOSE SERPL-MCNC: 98 MG/DL (ref 70–99)
HCT VFR BLD AUTO: 42 % (ref 35–47)
HDLC SERPL-MCNC: 77 MG/DL
HGB BLD-MCNC: 14.3 G/DL (ref 11.7–15.7)
IMM GRANULOCYTES # BLD: 0 10E3/UL
IMM GRANULOCYTES NFR BLD: 0 %
LDLC SERPL CALC-MCNC: 166 MG/DL
LYMPHOCYTES # BLD AUTO: 1.1 10E3/UL (ref 0.8–5.3)
LYMPHOCYTES NFR BLD AUTO: 19 %
MCH RBC QN AUTO: 35.8 PG (ref 26.5–33)
MCHC RBC AUTO-ENTMCNC: 34 G/DL (ref 31.5–36.5)
MCV RBC AUTO: 105 FL (ref 78–100)
MONOCYTES # BLD AUTO: 0.5 10E3/UL (ref 0–1.3)
MONOCYTES NFR BLD AUTO: 8 %
NEUTROPHILS # BLD AUTO: 4 10E3/UL (ref 1.6–8.3)
NEUTROPHILS NFR BLD AUTO: 67 %
NONHDLC SERPL-MCNC: 186 MG/DL
PLATELET # BLD AUTO: 237 10E3/UL (ref 150–450)
POTASSIUM SERPL-SCNC: 4.4 MMOL/L (ref 3.4–5.3)
PROT SERPL-MCNC: 6.8 G/DL (ref 6.4–8.3)
RBC # BLD AUTO: 4 10E6/UL (ref 3.8–5.2)
SODIUM SERPL-SCNC: 140 MMOL/L (ref 135–145)
TRIGL SERPL-MCNC: 102 MG/DL
TSH SERPL DL<=0.005 MIU/L-ACNC: 0.67 UIU/ML (ref 0.3–4.2)
VIT B12 SERPL-MCNC: >4000 PG/ML (ref 232–1245)
VIT D+METAB SERPL-MCNC: 63 NG/ML (ref 20–50)
WBC # BLD AUTO: 5.9 10E3/UL (ref 4–11)

## 2024-05-14 PROCEDURE — 82306 VITAMIN D 25 HYDROXY: CPT

## 2024-05-14 PROCEDURE — 84443 ASSAY THYROID STIM HORMONE: CPT

## 2024-05-14 PROCEDURE — 80053 COMPREHEN METABOLIC PANEL: CPT

## 2024-05-14 PROCEDURE — 82607 VITAMIN B-12: CPT

## 2024-05-14 PROCEDURE — 85025 COMPLETE CBC W/AUTO DIFF WBC: CPT

## 2024-05-14 PROCEDURE — 36415 COLL VENOUS BLD VENIPUNCTURE: CPT

## 2024-05-14 PROCEDURE — 80061 LIPID PANEL: CPT

## 2024-05-15 DIAGNOSIS — R79.89 HIGH SERUM VITAMIN B12: ICD-10-CM

## 2024-05-15 DIAGNOSIS — R79.89 ELEVATED SERUM CREATININE: ICD-10-CM

## 2024-05-15 DIAGNOSIS — R71.8 ELEVATED MCV: Primary | ICD-10-CM

## 2024-05-15 DIAGNOSIS — E67.3 HYPERVITAMINOSIS D: ICD-10-CM

## 2024-05-16 ENCOUNTER — OFFICE VISIT (OUTPATIENT)
Dept: FAMILY MEDICINE | Facility: CLINIC | Age: 89
End: 2024-05-16
Payer: COMMERCIAL

## 2024-05-16 VITALS
BODY MASS INDEX: 30.13 KG/M2 | WEIGHT: 192 LBS | OXYGEN SATURATION: 97 % | SYSTOLIC BLOOD PRESSURE: 138 MMHG | DIASTOLIC BLOOD PRESSURE: 58 MMHG | TEMPERATURE: 98.4 F | RESPIRATION RATE: 20 BRPM | HEART RATE: 70 BPM | HEIGHT: 67 IN

## 2024-05-16 DIAGNOSIS — E03.9 HYPOTHYROIDISM, UNSPECIFIED TYPE: Primary | ICD-10-CM

## 2024-05-16 DIAGNOSIS — N28.1 CYST OF RIGHT KIDNEY: ICD-10-CM

## 2024-05-16 DIAGNOSIS — K76.89 LIVER CYST: ICD-10-CM

## 2024-05-16 DIAGNOSIS — N18.31 CHRONIC KIDNEY DISEASE, STAGE 3A (H): ICD-10-CM

## 2024-05-16 DIAGNOSIS — R35.0 URINARY FREQUENCY: ICD-10-CM

## 2024-05-16 DIAGNOSIS — I51.7 MILD CARDIOMEGALY: ICD-10-CM

## 2024-05-16 DIAGNOSIS — N32.81 OAB (OVERACTIVE BLADDER): ICD-10-CM

## 2024-05-16 DIAGNOSIS — R01.1 UNDIAGNOSED CARDIAC MURMURS: ICD-10-CM

## 2024-05-16 DIAGNOSIS — E04.1 NODULAR THYROID DISEASE: ICD-10-CM

## 2024-05-16 PROCEDURE — 99214 OFFICE O/P EST MOD 30 MIN: CPT | Performed by: FAMILY MEDICINE

## 2024-05-16 RX ORDER — LEVOTHYROXINE SODIUM 100 UG/1
100 TABLET ORAL DAILY
Qty: 90 TABLET | Refills: 3 | Status: SHIPPED | OUTPATIENT
Start: 2024-05-16

## 2024-05-16 ASSESSMENT — PAIN SCALES - GENERAL: PAINLEVEL: NO PAIN (0)

## 2024-05-16 NOTE — PROGRESS NOTES
Assessment & Plan     Hypothyroidism, unspecified type  Stable labs. Asymtpomatic otherwise.  Patient had thyroid us screeing in [place] showing nodular thyroid. Repeat imaging for confirmation.  - levothyroxine (SYNTHROID/LEVOTHROID) 100 MCG tablet  Dispense: 90 tablet; Refill: 3  - US Thyroid    Chronic kidney disease, stage 3a (H)  Plan to repeat tests in June 2024.  Drink plenty of water everyday.   Avoid taking Ibuprofen or Naproxen (NSAIDs)     Liver cyst  Inadequate characterization in CT chest in [place].  Advised patient better visualization through liver MRI to rule out more concerning lesion.  Patient agreed to imaging.  - MR Liver wo & w Contrast    Mild cardiomegaly  With finding of cardiac murmur, advised patient importance of ruling out occult cardiomyopathy and valvular disease.  Her syncope could also be from aortic stenosis.   She agreed to pursue echocardiogram.  - Echocardiogram Complete    Undiagnosed cardiac murmurs  See above.  - Echocardiogram Complete    Cyst of right kidney  Advised patient on finding on US. Patient does have CKD.  Patient preferred to limit number of imaging at this time.   Hence, consult with urology. Will defer decision for additional imaging to them.  - Adult Urology  Referral    Nodular thyroid disease  See above for plan.  - US Thyroid    Urinary frequency  Chronic for patient and she has declined further urology consult before.  Patient agreed to referral now considering she ha sthe concern about kidney cyst.  - Adult Urology  Referral    OAB (overactive bladder)  See above  - Adult Urology  Referral          MED REC REQUIRED  Post Medication Reconciliation Status: discharge medications reconciled, continue medications without change      Patient Instructions   To schedule the liver MRI, call [phone].     Schedule heart echocardiogram.  Contact Cardiac Services  at [phone], to schedule this appointment.     Referrals to  urology has been signed. Schedulers will call you in the next 3-5 business days.     Schedule thyroidi ultrasound for August 2024.  To schedule the ultrasound, call [phone].     Refill for levothyroxine has been sent to pharmacy.    Schedule repeat blood test for June 2024.    Raheel Shetty is a 89 year old, presenting for the following health issues:  Hospital F/U (Admitted to a hospital in [facility] for passing out for 30min +. Potassium was critically low. ), Back Pain, Hypertension, and Thyroid Problem        5/16/2024     1:12 PM   Additional Questions   Roomed by Jeanine RODRIGUEZ MA   Accompanied by self         5/16/2024     1:12 PM   Patient Reported Additional Medications   Patient reports taking the following new medications none     Via the Health Maintenance questionnaire, the patient has reported the following services have been completed DEXA: hospital 2024-05-04, this information has been sent to the abstraction team.  History of Present Illness       Back Pain:  She presents for follow up of back pain. Patient's back pain is a chronic problem.  Location of back pain:  Right lower back, left lower back, right hip and left hip  Description of back pain: dull ache  Back pain spreads: left buttocks, right knee and left knee    Since patient first noticed back pain, pain is: always present, but gets better and worse  Does back pain interfere with her job:  No       Hypertension: She presents for follow up of hypertension.  She does check blood pressure  regularly outside of the clinic. Outside blood pressures have been over 140/90. She follows a low salt diet.     Hypothyroidism:     Since last visit, patient describes the following symptoms::  Constipation, Dry skin, Fatigue, Hair loss and Weight gain    Weight gain::  5 lbs.    She eats 2-3 servings of fruits and vegetables daily.She consumes 0 sweetened beverage(s) daily.She exercises with enough effort to increase her heart rate 9 or less  "minutes per day.  She exercises with enough effort to increase her heart rate 3 or less days per week.   She is taking medications regularly.     IMPRESSION:    1. Expected evolution of subacute L2 vertebral body compression  fracture extending to the bilateral pedicles with mild height loss.  2. Degenerative change and incidental findings as detailed.        Hospital Follow-up Visit:    Hospital/Nursing Home/IP Rehab Facility:  [facility]  Date of Admission: 5/5/24  Date of Discharge: 5/6/24  Reason(s) for Admission: Syncope episode lasting more than 30 min.  Was the patient in the ICU or did the patient experience delirium during hospitalization?  No  Do you have any other stressors you would like to discuss with your provider? No    Problems taking medications regularly:  None  Medication changes since discharge: None  Problems adhering to non-medication therapy:  None    Summary of hospitalization:  Discharge summary unavailable  Patient only had discharge instructions documents.    Diagnostic Tests/Treatments reviewed.  Follow up needed: none  Other Healthcare Providers Involved in Patient s Care:         None  Update since discharge: improved.         Plan of care communicated with patient               Review of Systems  Constitutional, neuro, ENT, endocrine, pulmonary, cardiac, gastrointestinal, genitourinary, musculoskeletal, integument and psychiatric systems are negative, except as otherwise noted.      Objective    /58   Pulse 70   Temp 98.4  F (36.9  C) (Tympanic)   Resp 20   Ht 1.702 m (5' 7\")   Wt 87.1 kg (192 lb)   SpO2 97%   BMI 30.07 kg/m    Body mass index is 30.07 kg/m .  Physical Exam   GENERAL: borderline obese, initial antalgic gait upon getting up from chair , alert and no distress  EYES: no icterus  NECK: no swelling; thyroid not very palpable; no palpable mass or LAD  RESP: lungs clear to auscultation - no rales, no rhonchi, no wheezes  CV: regular rates " and rhythm, normal S1 S2, no S3 or S4 and no murmur, no click or rub  ABD: rounded abdomen, no skin changes, no TTP, no palpable mass, no guarding, no Parker's sign, no palpable organomegaly  MS: extremities- no gross deformities noted, no edema  SKIN: good turgor, no jaundice or rash     Lab on 05/14/2024   Component Date Value Ref Range Status    Cholesterol 05/14/2024 263 (H)  <200 mg/dL Final    Triglycerides 05/14/2024 102  <150 mg/dL Final    Direct Measure HDL 05/14/2024 77  >=50 mg/dL Final    LDL Cholesterol Calculated 05/14/2024 166 (H)  <=100 mg/dL Final    Non HDL Cholesterol 05/14/2024 186 (H)  <130 mg/dL Final    Patient Fasting > 8hrs? 05/14/2024 Yes   Final    Sodium 05/14/2024 140  135 - 145 mmol/L Final    Reference intervals for this test were updated on 09/26/2023 to more accurately reflect our healthy population. There may be differences in the flagging of prior results with similar values performed with this method. Interpretation of those prior results can be made in the context of the updated reference intervals.     Potassium 05/14/2024 4.4  3.4 - 5.3 mmol/L Final    Carbon Dioxide (CO2) 05/14/2024 27  22 - 29 mmol/L Final    Anion Gap 05/14/2024 11  7 - 15 mmol/L Final    Urea Nitrogen 05/14/2024 20.0  8.0 - 23.0 mg/dL Final    Creatinine 05/14/2024 1.00 (H)  0.51 - 0.95 mg/dL Final    GFR Estimate 05/14/2024 54 (L)  >60 mL/min/1.73m2 Final    Calcium 05/14/2024 9.6  8.8 - 10.2 mg/dL Final    Chloride 05/14/2024 102  98 - 107 mmol/L Final    Glucose 05/14/2024 98  70 - 99 mg/dL Final    Alkaline Phosphatase 05/14/2024 68  40 - 150 U/L Final    Reference intervals for this test were updated on 11/14/2023 to more accurately reflect our healthy population. There may be differences in the flagging of prior results with similar values performed with this method. Interpretation of those prior results can be made in the context of the updated reference intervals.    AST 05/14/2024 20  0 - 45 U/L  Final    Reference intervals for this test were updated on 6/12/2023 to more accurately reflect our healthy population. There may be differences in the flagging of prior results with similar values performed with this method. Interpretation of those prior results can be made in the context of the updated reference intervals.    ALT 05/14/2024 12  0 - 50 U/L Final    Reference intervals for this test were updated on 6/12/2023 to more accurately reflect our healthy population. There may be differences in the flagging of prior results with similar values performed with this method. Interpretation of those prior results can be made in the context of the updated reference intervals.      Protein Total 05/14/2024 6.8  6.4 - 8.3 g/dL Final    Albumin 05/14/2024 4.5  3.5 - 5.2 g/dL Final    Bilirubin Total 05/14/2024 0.6  <=1.2 mg/dL Final    Patient Fasting > 8hrs? 05/14/2024 Yes   Final    TSH 05/14/2024 0.67  0.30 - 4.20 uIU/mL Final    Vitamin D, Total (25-Hydroxy) 05/14/2024 63 (H)  20 - 50 ng/mL Final    indicates supplementation, with increased risk of hypercalciuria    Vitamin B12 05/14/2024 >4,000 (H)  232 - 1,245 pg/mL Final    WBC Count 05/14/2024 5.9  4.0 - 11.0 10e3/uL Final    RBC Count 05/14/2024 4.00  3.80 - 5.20 10e6/uL Final    Hemoglobin 05/14/2024 14.3  11.7 - 15.7 g/dL Final    Hematocrit 05/14/2024 42.0  35.0 - 47.0 % Final    MCV 05/14/2024 105 (H)  78 - 100 fL Final    MCH 05/14/2024 35.8 (H)  26.5 - 33.0 pg Final    MCHC 05/14/2024 34.0  31.5 - 36.5 g/dL Final    RDW 05/14/2024 12.5  10.0 - 15.0 % Final    Platelet Count 05/14/2024 237  150 - 450 10e3/uL Final    % Neutrophils 05/14/2024 67  % Final    % Lymphocytes 05/14/2024 19  % Final    % Monocytes 05/14/2024 8  % Final    % Eosinophils 05/14/2024 5  % Final    % Basophils 05/14/2024 1  % Final    % Immature Granulocytes 05/14/2024 0  % Final    Absolute Neutrophils 05/14/2024 4.0  1.6 - 8.3 10e3/uL Final    Absolute Lymphocytes 05/14/2024 1.1   0.8 - 5.3 10e3/uL Final    Absolute Monocytes 05/14/2024 0.5  0.0 - 1.3 10e3/uL Final    Absolute Eosinophils 05/14/2024 0.3  0.0 - 0.7 10e3/uL Final    Absolute Basophils 05/14/2024 0.1  0.0 - 0.2 10e3/uL Final    Absolute Immature Granulocytes 05/14/2024 0.0  <=0.4 10e3/uL Final           Signed Electronically by: Neno Genao MD

## 2024-05-17 ENCOUNTER — TELEPHONE (OUTPATIENT)
Dept: UROLOGY | Facility: CLINIC | Age: 89
End: 2024-05-17
Payer: COMMERCIAL

## 2024-05-17 NOTE — TELEPHONE ENCOUNTER
[facility] Call Center    Phone Message    May a detailed message be left on voicemail: yes     Reason for Call: Other: Patient being referred for multiple Dx's by referring provider. Writer unsure best provider to schedule with, since they fall under different visit types. Sending encounter message for review and follow-up with Pt for scheduling. Thank you!       Cyst of right kidney  Urinary frequency  OAB (overactive bladder)       Action Taken: Message routed to:  [facility] URO    Travel Screening: Not Applicable

## 2024-05-21 NOTE — TELEPHONE ENCOUNTER
Left Voicemail (1st Attempt) for the patient to call back and schedule the following:    Appointment type: new urology  Provider: humza aranda  Return date: next available  Specialty phone number: [phone]  Additonal Notes: per message below, pt would have to see different providers. Dr. Aranda can see female over active bladder and also renal cyst per protocols. Also, Dr. Aranda works in locations closest to pt. I have advised this for pt to call back to schedule.

## 2024-05-30 ENCOUNTER — ANCILLARY PROCEDURE (OUTPATIENT)
Dept: CARDIOLOGY | Facility: CLINIC | Age: 89
End: 2024-05-30
Attending: FAMILY MEDICINE
Payer: COMMERCIAL

## 2024-05-30 DIAGNOSIS — I51.7 MILD CARDIOMEGALY: ICD-10-CM

## 2024-05-30 DIAGNOSIS — R01.1 UNDIAGNOSED CARDIAC MURMURS: ICD-10-CM

## 2024-05-30 LAB — LVEF ECHO: NORMAL

## 2024-05-30 PROCEDURE — 93306 TTE W/DOPPLER COMPLETE: CPT | Performed by: INTERNAL MEDICINE

## 2024-06-10 ENCOUNTER — ANCILLARY PROCEDURE (OUTPATIENT)
Dept: MRI IMAGING | Facility: CLINIC | Age: 89
End: 2024-06-10
Attending: FAMILY MEDICINE
Payer: COMMERCIAL

## 2024-06-10 DIAGNOSIS — S32.020S CLOSED COMPRESSION FRACTURE OF L2 LUMBAR VERTEBRA, SEQUELA: Primary | ICD-10-CM

## 2024-06-10 DIAGNOSIS — K76.89 LIVER CYST: Primary | ICD-10-CM

## 2024-06-10 DIAGNOSIS — K83.8 COMMON BILE DUCT DILATATION: ICD-10-CM

## 2024-06-10 DIAGNOSIS — R93.2 ABNORMAL FINDINGS ON DIAGNOSTIC IMAGING OF GALLBLADDER: ICD-10-CM

## 2024-06-10 DIAGNOSIS — K76.89 LIVER CYST: ICD-10-CM

## 2024-06-10 DIAGNOSIS — K86.2 CYSTIC MASS OF PANCREAS: ICD-10-CM

## 2024-06-10 PROCEDURE — 74183 MRI ABD W/O CNTR FLWD CNTR: CPT | Mod: TC | Performed by: RADIOLOGY

## 2024-06-10 PROCEDURE — A9585 GADOBUTROL INJECTION: HCPCS | Performed by: RADIOLOGY

## 2024-06-10 RX ORDER — GADOBUTROL 604.72 MG/ML
8.5 INJECTION INTRAVENOUS ONCE
Status: COMPLETED | OUTPATIENT
Start: 2024-06-10 | End: 2024-06-10

## 2024-06-10 RX ADMIN — GADOBUTROL 8.5 ML: 604.72 INJECTION INTRAVENOUS at 08:30

## 2024-06-11 ENCOUNTER — TELEPHONE (OUTPATIENT)
Dept: GASTROENTEROLOGY | Facility: CLINIC | Age: 89
End: 2024-06-11
Payer: COMMERCIAL

## 2024-06-11 NOTE — TELEPHONE ENCOUNTER
[facility]     Referring provider: Neno Genao MD     Referred to: [facility]     Provider Requested: na     Referral Received: 06/11/24       Records received: Epic     Images received: PACS    Insurance Coverage: BCBS    Evaluation for: dilated common bile duct and possible stricture of midportion, multiple pancreatic cysts, liver cysts      Clinical History (per RN review):     MR Liver 6/10/24                                                       IMPRESSION:   1.  Multiple simple appearing and minimally complex hepatic cysts are  present. The largest cyst measures up to 9.5 cm in the right hepatic  lobe. No suspicious nodular enhancement is seen.  2.  Several small pancreatic cysts measuring up to 4 mm. Recommend  follow-up exam per guidelines below.  3.  Age-indeterminate, moderate compression fracture along the L2  vertebral level. Suggest correlation with clinical exam and consider  dedicated spinal imaging of the previously performed.    MD review date: 06/11/24    MD Decision for clinic consultation/Orders:            Referral updates/Patient contacted:

## 2024-06-18 ENCOUNTER — TELEPHONE (OUTPATIENT)
Dept: FAMILY MEDICINE | Facility: CLINIC | Age: 89
End: 2024-06-18
Payer: COMMERCIAL

## 2024-06-18 DIAGNOSIS — K86.2 CYSTIC MASS OF PANCREAS: ICD-10-CM

## 2024-06-18 DIAGNOSIS — K76.89 LIVER CYST: Primary | ICD-10-CM

## 2024-06-18 DIAGNOSIS — K83.8 COMMON BILE DUCT DILATATION: ICD-10-CM

## 2024-06-18 DIAGNOSIS — R93.2 ABNORMAL FINDINGS ON DIAGNOSTIC IMAGING OF GALLBLADDER: ICD-10-CM

## 2024-06-18 NOTE — TELEPHONE ENCOUNTER
There is an order for US Abdomen Limited signed on 6/10/2024 - this will visualize the liver and gallbladder area.  Order for [facility] consult signed.

## 2024-06-18 NOTE — TELEPHONE ENCOUNTER
Per Dr Genao's 6/10/24 result note after pt completed MR liver, Dr Genao recommended   Liver/Gallbladder ultrasound.  I don't see that this order has been placed yet.  Routed to Dr Genao for order placement.  Referral to GI.  [facility].  MR spine to follow up compression deformity seen at L2  MR pancreas later this year in October 2024.        Copy of Dr Genao's MR liver result note:    Carito Shetty.     Your liver MRI showed muleiple simple-appearing liver cysts with no suspicious nodular findings.  Pancreas showed several small cysts up to 4 mm big.     Moderate compression deformity along the L2 vertebra. This was present in 2021 but was reported mild then. There appears to be progression. Due to this, a dedicated spine MRI was suggested by the radiologist. To schedule the lumbar spine MRI, call [phone].     The radiologist recommended repeat MRI of the pancreas for the cysts in October 2024. I have ordered this. This can also see the liver cysts. To schedule the imaging, call [phone].        Please contact the clinic if you have any additional questions or concerns.  Have a great day!     Yours truly,  Dr. Birgit Shetty.     The radiologist made an addendum to your liver MRI report.     - there is mild enlargement of the common bile duct with a suspected narrowing in the mid portion that could be causing obstruction of bile flow. Your most recent blood tests did not show liver function abnormality though.  - the gallbladder showed possible stone vs stagnant fluid in it (sludge), less likely to be a polyp. The radiologist recommended follow up with a gallbladder ultrasound.     Plan:  - schedule liver/gallbladder ultrasound. To schedule the ultrasound, call [phone].  - I referred you to a gastroenterologist for further assessment of the seen bile duct narrowing but no abnormal liver function to date. Schedulers will call you in 3-5 business days to get this appointment  scheduled.     Please contact the clinic if you have any additional questions or concerns.  Have a great day!     Yours truly,  Dr. Genao

## 2024-06-18 NOTE — TELEPHONE ENCOUNTER
"Patient called the clinic to ask about imaging that was ordered.   Patient stated she received a call from imaging to schedule. She is not sure what imaging is supposed to be done or why. She said she remembered them saying it was later in the year because it was close to the time she would be back in [place].   Patient stated she already had her MRI and her US that was recently ordered.    RN cannot tell what still needs to be communicated to patient and what still needs to be done as far as imaging. RN sees that she is supposed to have a repeat MRI of the pancreas? Also see GI referral ordered.     The only result note that I see on the last imaging is \"no notes recorded by provider\".    Please review results and labs and advise patient.      Zhanna Newton RN on 6/18/2024 at 11:26 AM    "

## 2024-06-18 NOTE — TELEPHONE ENCOUNTER
"She was sent an additional [employer] message after radiologist made an addendum to the MRI report. It does not appear she read that. Text of that message is as follows:    \"The radiologist made an addendum to your liver MRI report.     - there is mild enlargement of the common bile duct with a suspected narrowing in the mid portion that could be causing obstruction of bile flow. Your most recent blood tests did not show liver function abnormality though.  - the gallbladder showed possible stone vs stagnant fluid in it (sludge), less likely to be a polyp. The radiologist recommended follow up with a gallbladder ultrasound.     Plan:  - schedule liver/gallbladder ultrasound. To schedule the ultrasound, call [phone].  - I referred you to a gastroenterologist for further assessment of the seen bile duct narrowing but no abnormal liver function to date. Schedulers will call you in 3-5 business days to get this appointment scheduled.\"  "

## 2024-06-18 NOTE — TELEPHONE ENCOUNTER
I called pt and explained #'s 1-4 below.    Pt had not seen her [employer] messages because she was having trouble accessing her [employer] account.  Dtr is with pt now.  I gave them the phone number to call and arrange access to [employer].    Pt is setting up MR of spine now as ordered.    I do not see liver/gallbladder ultrasound order.    Also, pt does not want to see GI specialists with [facility].  Wants referral to [facility] instead for follow up.  Routed to Dr Genao to have these orders placed.    Thank you.    Ariadna Shah RN

## 2024-06-18 NOTE — TELEPHONE ENCOUNTER
I called and spoke with pt and dtr.    Shared information and phone number to [facility].  Pt has ultrasound scheduled on 7/16.  Pt will arrange GI consult afterward.    Ariadna Shah RN

## 2024-06-19 ENCOUNTER — LAB (OUTPATIENT)
Dept: LAB | Facility: CLINIC | Age: 89
End: 2024-06-19
Payer: COMMERCIAL

## 2024-06-19 DIAGNOSIS — R79.89 ELEVATED SERUM CREATININE: ICD-10-CM

## 2024-06-19 DIAGNOSIS — E67.3 HYPERVITAMINOSIS D: ICD-10-CM

## 2024-06-19 DIAGNOSIS — R79.89 HIGH SERUM VITAMIN B12: ICD-10-CM

## 2024-06-19 DIAGNOSIS — R71.8 ELEVATED MCV: ICD-10-CM

## 2024-06-19 LAB
BASOPHILS # BLD AUTO: 0.1 10E3/UL (ref 0–0.2)
BASOPHILS NFR BLD AUTO: 1 %
EOSINOPHIL # BLD AUTO: 0.2 10E3/UL (ref 0–0.7)
EOSINOPHIL NFR BLD AUTO: 3 %
ERYTHROCYTE [DISTWIDTH] IN BLOOD BY AUTOMATED COUNT: 12.1 % (ref 10–15)
HCT VFR BLD AUTO: 42 % (ref 35–47)
HGB BLD-MCNC: 14.3 G/DL (ref 11.7–15.7)
IMM GRANULOCYTES # BLD: 0 10E3/UL
IMM GRANULOCYTES NFR BLD: 0 %
LYMPHOCYTES # BLD AUTO: 1.5 10E3/UL (ref 0.8–5.3)
LYMPHOCYTES NFR BLD AUTO: 23 %
MCH RBC QN AUTO: 35.3 PG (ref 26.5–33)
MCHC RBC AUTO-ENTMCNC: 34 G/DL (ref 31.5–36.5)
MCV RBC AUTO: 104 FL (ref 78–100)
MONOCYTES # BLD AUTO: 0.5 10E3/UL (ref 0–1.3)
MONOCYTES NFR BLD AUTO: 7 %
NEUTROPHILS # BLD AUTO: 4.3 10E3/UL (ref 1.6–8.3)
NEUTROPHILS NFR BLD AUTO: 66 %
PLATELET # BLD AUTO: 237 10E3/UL (ref 150–450)
RBC # BLD AUTO: 4.05 10E6/UL (ref 3.8–5.2)
RETICS # AUTO: 0.1 10E6/UL
RETICS/RBC NFR AUTO: 2.3 %
WBC # BLD AUTO: 6.6 10E3/UL (ref 4–11)

## 2024-06-19 PROCEDURE — 36415 COLL VENOUS BLD VENIPUNCTURE: CPT

## 2024-06-19 PROCEDURE — 82306 VITAMIN D 25 HYDROXY: CPT

## 2024-06-19 PROCEDURE — 82607 VITAMIN B-12: CPT

## 2024-06-19 PROCEDURE — 85045 AUTOMATED RETICULOCYTE COUNT: CPT

## 2024-06-19 PROCEDURE — 85025 COMPLETE CBC W/AUTO DIFF WBC: CPT

## 2024-06-19 PROCEDURE — 82565 ASSAY OF CREATININE: CPT

## 2024-06-20 DIAGNOSIS — R79.89 HIGH SERUM VITAMIN B12: Primary | ICD-10-CM

## 2024-06-20 DIAGNOSIS — E67.3 HYPERVITAMINOSIS D: ICD-10-CM

## 2024-06-20 LAB
CREAT SERPL-MCNC: 0.93 MG/DL (ref 0.51–0.95)
EGFRCR SERPLBLD CKD-EPI 2021: 59 ML/MIN/1.73M2
PATH REPORT.COMMENTS IMP SPEC: NORMAL
PATH REPORT.COMMENTS IMP SPEC: NORMAL
PATH REPORT.FINAL DX SPEC: NORMAL
PATH REPORT.MICROSCOPIC SPEC OTHER STN: NORMAL
PATH REPORT.MICROSCOPIC SPEC OTHER STN: NORMAL
VIT B12 SERPL-MCNC: >4000 PG/ML (ref 232–1245)
VIT D+METAB SERPL-MCNC: 61 NG/ML (ref 20–50)

## 2024-07-03 ENCOUNTER — LAB (OUTPATIENT)
Dept: LAB | Facility: CLINIC | Age: 89
End: 2024-07-03
Payer: COMMERCIAL

## 2024-07-03 ENCOUNTER — OFFICE VISIT (OUTPATIENT)
Dept: UROLOGY | Facility: CLINIC | Age: 89
End: 2024-07-03
Payer: COMMERCIAL

## 2024-07-03 VITALS
HEIGHT: 67 IN | TEMPERATURE: 98.5 F | DIASTOLIC BLOOD PRESSURE: 73 MMHG | BODY MASS INDEX: 30.42 KG/M2 | SYSTOLIC BLOOD PRESSURE: 126 MMHG | WEIGHT: 193.8 LBS | RESPIRATION RATE: 12 BRPM | OXYGEN SATURATION: 94 % | HEART RATE: 78 BPM

## 2024-07-03 DIAGNOSIS — R35.0 URINARY FREQUENCY: ICD-10-CM

## 2024-07-03 DIAGNOSIS — N32.81 OAB (OVERACTIVE BLADDER): ICD-10-CM

## 2024-07-03 DIAGNOSIS — N18.31 CHRONIC KIDNEY DISEASE, STAGE 3A (H): Primary | ICD-10-CM

## 2024-07-03 DIAGNOSIS — N28.1 CYST OF RIGHT KIDNEY: ICD-10-CM

## 2024-07-03 LAB
ALBUMIN UR-MCNC: NEGATIVE MG/DL
APPEARANCE UR: ABNORMAL
BACTERIA #/AREA URNS HPF: ABNORMAL /HPF
BILIRUB UR QL STRIP: NEGATIVE
COLOR UR AUTO: YELLOW
GLUCOSE UR STRIP-MCNC: NEGATIVE MG/DL
HGB UR QL STRIP: ABNORMAL
KETONES UR STRIP-MCNC: ABNORMAL MG/DL
LEUKOCYTE ESTERASE UR QL STRIP: ABNORMAL
NITRATE UR QL: POSITIVE
PH UR STRIP: 6.5 [PH] (ref 5–7)
RBC #/AREA URNS AUTO: ABNORMAL /HPF
SP GR UR STRIP: 1.02 (ref 1–1.03)
SQUAMOUS #/AREA URNS AUTO: ABNORMAL /LPF
UROBILINOGEN UR STRIP-ACNC: 0.2 E.U./DL
WBC #/AREA URNS AUTO: ABNORMAL /HPF

## 2024-07-03 PROCEDURE — 82570 ASSAY OF URINE CREATININE: CPT

## 2024-07-03 PROCEDURE — 99204 OFFICE O/P NEW MOD 45 MIN: CPT | Performed by: STUDENT IN AN ORGANIZED HEALTH CARE EDUCATION/TRAINING PROGRAM

## 2024-07-03 PROCEDURE — 87086 URINE CULTURE/COLONY COUNT: CPT

## 2024-07-03 PROCEDURE — 87186 SC STD MICRODIL/AGAR DIL: CPT

## 2024-07-03 PROCEDURE — 81001 URINALYSIS AUTO W/SCOPE: CPT

## 2024-07-03 PROCEDURE — 82043 UR ALBUMIN QUANTITATIVE: CPT

## 2024-07-03 ASSESSMENT — PAIN SCALES - GENERAL: PAINLEVEL: NO PAIN (1)

## 2024-07-03 NOTE — PROGRESS NOTES
UROLOGY OUTPATIENT VISIT      Chief Complaint:   Urinary frequency      Synopsis   Sena Flores is a very pleasant AGE: 89 year old year old person  She has a history of hypertension and hypothyroidism    She is referred to see me for possible cyst in her kidney and urinary frequency    As far as her urinary frequency she reports voiding almost every hour during the daytime and may be every 20 minutes sometimes at night.  She is having difficulty sleeping as a result of this.  She denies any hematuria.  She reports urgency and urge incontinence she denies any stress incontinence she reports going through about 3 pads per day    As far as her fluid intake she drinks mostly water.  She drinks coffee every morning 1 to 2 cups.  She does drink iced tea.  She does not drink any soda she likes spicy foods and she drinks alcohol every night    Imaging  I reviewed the MRI scan that she had of her liver and based on my interpretation I do not see any cysts or masses in the kidneys and there is no hydronephrosis    She had a bladder ultrasound about a year ago BLADDER: Prevoid bladder volume is estimated at 260 mL. Postvoid  bladder volume is estimated at 78 mL. No bladder wall thickening.    The following distinct labs were reviewed   Most Recent 3 BMP's:  Recent Labs   Lab Test 06/19/24  1130 05/14/24  0914 08/15/22  1158 11/01/21  1010   NA  --  140 141 137   POTASSIUM  --  4.4 4.0 4.3   CHLORIDE  --  102 106 104   CO2  --  27 30 31   BUN  --  20.0 32* 17   CR 0.93 1.00* 0.91 0.96   ANIONGAP  --  11 5 2*   LELE  --  9.6 8.8 9.5   GLC  --  98 90 102*     Most Recent Urinalysis:  Recent Labs   Lab Test 07/12/23  1514   COLOR Yellow   APPEARANCE Slightly Cloudy*   URINEGLC Negative   URINEBILI Negative   URINEKETONE Negative   SG 1.015   UBLD Moderate*   URINEPH 6.0   PROTEIN Negative   UROBILINOGEN 0.2   NITRITE Positive*   LEUKEST Small*   RBCU 2-5*   WBCU 5-10*       Medical Comorbidities      Past Medical History:    Diagnosis Date    Arthritis     Hypertension     Hypothyroidism, unspecified type 10/31/2019               Medications     Current Outpatient Medications   Medication Sig Dispense Refill    Acetaminophen (TYLENOL PO) Take by mouth every 6 hours as needed for mild pain or fever      amLODIPine (NORVASC) 2.5 MG tablet Take 1 tablet (2.5 mg) by mouth daily 90 tablet 3    hydrochlorothiazide (HYDRODIURIL) 12.5 MG tablet Take 1 tablet (12.5 mg) by mouth daily 90 tablet 3    ibuprofen (ADVIL/MOTRIN) 100 MG tablet Take 100 mg by mouth every 4 hours as needed      levothyroxine (SYNTHROID/LEVOTHROID) 100 MCG tablet Take 1 tablet (100 mcg) by mouth daily 90 tablet 3     No current facility-administered medications for this visit.            Assessment/Plan   89-year-old female with a history of hypertension and hypothyroidism    #1 urinary frequency  Many reasons for this which include a urinary tract infection or overactive bladder.  Will check a urine analysis today to rule out a urinary tract infection.  She did have a history of infection with 1 last year.  But she does not have any other symptoms other than the frequency.  I suspect she likely has an overactive bladder.  We will start with lifestyle changes with cutting back on bladder irritants such as the coffee and the alcohol.  I also recommended she raise her feet a few hours before bed to help the edema drain.  I also recommend pelvic floor physical therapy to have bladder retraining and urge suppression techniques.  If this does not work we can consider pharmacologic therapy.  I have given them a list of 3 medications to call their insurance and get the costs of.  I would like to see them back in around 2 months as a virtual visit to see how urinary symptoms are and decide if we need to go down the route of medications or not.    CC:  Neno Genao [facility]    Additional Coding Information:    Problems:  4 -- one undiagnosed new problem with uncertain  prognosis    Data Reviewed  Tests ordered: UA/UC  Independent interpretation of a test performed by another physician/other qualified health care professional (not separately reported) - MRI

## 2024-07-03 NOTE — NURSING NOTE
"Chief Complaint   Patient presents with    Consult     Cyst of right kidney, over active bladder       Vitals:    07/03/24 0935   BP: 126/73   BP Location: Left arm   Patient Position: Sitting   Cuff Size: Adult Large   Pulse: 78   Resp: 12   Temp: 98.5  F (36.9  C)   TempSrc: Tympanic   SpO2: 94%   Weight: 87.9 kg (193 lb 12.8 oz)   Height: 1.702 m (5' 7\")     Wt Readings from Last 1 Encounters:   07/03/24 87.9 kg (193 lb 12.8 oz)       Cindy YODER CMA.................7/3/2024    "

## 2024-07-04 LAB
CREAT UR-MCNC: 119 MG/DL
MICROALBUMIN UR-MCNC: <12 MG/L
MICROALBUMIN/CREAT UR: NORMAL MG/G{CREAT}

## 2024-07-06 ENCOUNTER — TELEPHONE (OUTPATIENT)
Dept: SURGERY | Facility: CLINIC | Age: 89
End: 2024-07-06
Payer: COMMERCIAL

## 2024-07-06 DIAGNOSIS — R35.0 URINARY FREQUENCY: Primary | ICD-10-CM

## 2024-07-06 RX ORDER — NITROFURANTOIN 25; 75 MG/1; MG/1
100 CAPSULE ORAL 2 TIMES DAILY
Qty: 10 CAPSULE | Refills: 0 | Status: SHIPPED | OUTPATIENT
Start: 2024-07-06 | End: 2024-07-11

## 2024-07-06 NOTE — TELEPHONE ENCOUNTER
I tried to call patient and daughter re urine culture results  Given urinary frequency and +Ucx for E Coli I recommend treating    Both went to voice mail so I left voice mails on both    I will send macrobid 100 mg BID x 5 days    Domenica Sandhu MD

## 2024-07-08 LAB
BACTERIA UR CULT: ABNORMAL
BACTERIA UR CULT: ABNORMAL

## 2024-07-16 ENCOUNTER — ANCILLARY PROCEDURE (OUTPATIENT)
Dept: MRI IMAGING | Facility: CLINIC | Age: 89
End: 2024-07-16
Attending: FAMILY MEDICINE
Payer: COMMERCIAL

## 2024-07-16 ENCOUNTER — ANCILLARY PROCEDURE (OUTPATIENT)
Dept: ULTRASOUND IMAGING | Facility: CLINIC | Age: 89
End: 2024-07-16
Attending: FAMILY MEDICINE
Payer: COMMERCIAL

## 2024-07-16 DIAGNOSIS — K76.89 LIVER CYST: ICD-10-CM

## 2024-07-16 DIAGNOSIS — R93.2 ABNORMAL FINDINGS ON DIAGNOSTIC IMAGING OF GALLBLADDER: ICD-10-CM

## 2024-07-16 DIAGNOSIS — K83.8 COMMON BILE DUCT DILATATION: ICD-10-CM

## 2024-07-16 DIAGNOSIS — S32.020S CLOSED COMPRESSION FRACTURE OF L2 LUMBAR VERTEBRA, SEQUELA: ICD-10-CM

## 2024-07-16 PROCEDURE — 72148 MRI LUMBAR SPINE W/O DYE: CPT | Mod: TC | Performed by: RADIOLOGY

## 2024-07-16 PROCEDURE — 76705 ECHO EXAM OF ABDOMEN: CPT | Mod: TC | Performed by: RADIOLOGY

## 2024-07-17 DIAGNOSIS — S32.020S CLOSED COMPRESSION FRACTURE OF L2 LUMBAR VERTEBRA, SEQUELA: Primary | ICD-10-CM

## 2024-07-17 DIAGNOSIS — M48.061 SPINAL STENOSIS OF LUMBAR REGION, UNSPECIFIED WHETHER NEUROGENIC CLAUDICATION PRESENT: ICD-10-CM

## 2024-07-17 DIAGNOSIS — M48.061 LUMBAR FORAMINAL STENOSIS: ICD-10-CM

## 2024-08-02 ENCOUNTER — THERAPY VISIT (OUTPATIENT)
Dept: PHYSICAL THERAPY | Facility: CLINIC | Age: 89
End: 2024-08-02
Attending: STUDENT IN AN ORGANIZED HEALTH CARE EDUCATION/TRAINING PROGRAM
Payer: COMMERCIAL

## 2024-08-02 DIAGNOSIS — R35.0 URINARY FREQUENCY: ICD-10-CM

## 2024-08-02 DIAGNOSIS — N32.81 OAB (OVERACTIVE BLADDER): ICD-10-CM

## 2024-08-02 PROCEDURE — 97110 THERAPEUTIC EXERCISES: CPT | Mod: GP | Performed by: PHYSICAL THERAPIST

## 2024-08-02 PROCEDURE — 97530 THERAPEUTIC ACTIVITIES: CPT | Mod: GP | Performed by: PHYSICAL THERAPIST

## 2024-08-02 PROCEDURE — 97161 PT EVAL LOW COMPLEX 20 MIN: CPT | Mod: GP | Performed by: PHYSICAL THERAPIST

## 2024-08-02 NOTE — PROGRESS NOTES
PHYSICAL THERAPY EVALUATION  Type of Visit: Evaluation             Subjective       Presenting condition or subjective complaint: frequent urination  Date of onset: 02/02/24    Relevant medical history: Arthritis; Bladder or bowel problems; Cold or hot arm or leg   Dates & types of surgery: appendix 1959    Prior diagnostic imaging/testing results: MRI; CT scan; X-ray     Prior therapy history for the same diagnosis, illness or injury: No      Living Environment  Social support: With family members   Type of home: House   Stairs to enter the home: Yes       Ramp: No   Stairs inside the home: No       Help at home: Home management tasks (cooking, cleaning); Home and Yard maintenance tasks; Assist for driving and community activities  Equipment owned: Straight Cane; Walker with wheels     Employment:      Hobbies/Interests:      Patient goals for therapy: attend long term events   be away from home    Pain assessment: pt reports has back pain if standing for a long period of time. Does silver sneakers 3 days per week.     Objective      PELVIC EVALUATION  ADDITIONAL HISTORY:  Sex assigned at birth: Female  Gender identity: Female    Pronouns: She/Her Hers      Bladder History:  Feels bladder filling:    Triggers for feeling of inability to wait to go to the bathroom: Yes anytime standing up  How long can you wait to urinate: 0  Gets up at night to urinate: Yes 6  Can stop the flow of urine when urinating:    Volume of urine usually released: Average   Other issues: Straining to pass urine; Slow or hesitant urine stream; Trouble emptying bladder completely; Dribbling after urinating  Number of bladder infections in last 12 months:    Fluid intake per day:        Medications taken for bladder:       Activities causing urine leak: Cough; Sneeze; Hurrying to the bathroom due to a strong urge to urinate (pee)    Amount of urine typically leaked:    Pads used to help with leaking:          Bowel History:  Frequency of bowel  movement:  week or days  Consistency of stool: Hard    Ignores the urge to defecate: No  Other bowel issues: Straining to have bowel movement  Length of time spent trying to have a bowel movement:      Sexual Function History:  Sexual orientation:      Sexually active:    Lubrication used:      Pelvic pain:      Pain or difficulty with orgasms/erection/ejaculation:      State of menopause: Post-menopause (I am done with menopause)  Hormone medications:        Are you currently pregnant: No  Number of previous pregnancies: 7  Number of deliveries: 4  If you have delivered before, did you have any of these issues during delivery: Vaginal delivery  Have you been diagnosed with pelvic prolapse or abdominal separation: No  Do you get regular exercise: No  Have you tried pelvic floor strengthening exercises for 4 weeks: No  Do you have any history of trauma that is relevant to your care that you d like to share: No      Discussed reason for referral regarding pelvic health needs and external/internal pelvic floor muscle examination with patient/guardian.  Opportunity provided to ask questions and verbal consent for assessment and intervention was given.      POSTURE: Standing Posture: flexed trunk, cane too short for patient.  Sitting Posture: Thoracic kyphosis increased  GAIT: pt demo antalgic gait of flexed trunk, decreased step length and foot clearance with use of SPC in RUE.    Functional Strength: pt reports and demos use of BUE to assist with transfer out of a chair. However, if cued, pt will not use her BUE to perform the transfer    Knee AROM: lacking terminal knee extension seated R>L.     Assessment & Plan   CLINICAL IMPRESSIONS  Medical Diagnosis: Overactive Bladder, mixed incontinence    Treatment Diagnosis: Overactive Bladder, mixed incontinence   Impression/Assessment: Patient is a 89 year old female with Urinary mixed incontinence and overactive bladder complaints.  The following significant findings have  been identified: Pain, Decreased ROM/flexibility, Decreased strength, Impaired gait, Impaired muscle performance, and Decreased activity tolerance. These impairments interfere with their ability to perform self care tasks, recreational activities, household chores, household mobility, and community mobility as compared to previous level of function.     Clinical Decision Making (Complexity):  Clinical Presentation: Stable/Uncomplicated  Clinical Presentation Rationale: based on medical and personal factors listed in PT evaluation  Clinical Decision Making (Complexity): Low complexity    PLAN OF CARE  Treatment Interventions:  Interventions: Gait Training, Manual Therapy, Neuromuscular Re-education, Therapeutic Activity, Therapeutic Exercise    Long Term Goals     PT Goal 1  Goal Identifier: Transfers  Goal Description: pt will be able to get up and out of a chair without an incontinence episode >1 weeks  Rationale: to maximize safety and independence with performance of ADLs and functional tasks  Target Date: 09/27/24      Frequency of Treatment: 1x per week  Duration of Treatment: 8 weeks    Recommended Referrals to Other Professionals:   Education Assessment:   Learner/Method: Patient;No Barriers to Learning;Pictures/Video;Demonstration    Risks and benefits of evaluation/treatment have been explained.   Patient/Family/caregiver agrees with Plan of Care.     Evaluation Time:     PT Eval, Low Complexity Minutes (39336): 10       Signing Clinician: Sayra Velasquez, PT        [facility]                                                                                   OUTPATIENT PHYSICAL THERAPY      PLAN OF TREATMENT FOR OUTPATIENT REHABILITATION   Patient's Last Name, First Name, Sena Mistry YOB: 1935   Provider's Name   [facility]   Medical Record No.  [id number]     Onset Date: 02/02/24  Start of Care Date: 08/02/24      Medical Diagnosis:  Overactive Bladder, mixed incontinence      PT Treatment Diagnosis:  Overactive Bladder, mixed incontinence Plan of Treatment  Frequency/Duration: 1x per week/ 8 weeks    Certification date from 08/02/24 to 09/27/24         See note for plan of treatment details and functional goals     Sayra Velasquez, PT                         I CERTIFY THE NEED FOR THESE SERVICES FURNISHED UNDER        THIS PLAN OF TREATMENT AND WHILE UNDER MY CARE     (Physician attestation of this document indicates review and certification of the therapy plan).              Referring Provider:  Domenica Sandhu    Initial Assessment  See Epic Evaluation- Start of Care Date: 08/02/24

## 2024-08-16 ENCOUNTER — THERAPY VISIT (OUTPATIENT)
Dept: PHYSICAL THERAPY | Facility: CLINIC | Age: 89
End: 2024-08-16
Attending: STUDENT IN AN ORGANIZED HEALTH CARE EDUCATION/TRAINING PROGRAM
Payer: COMMERCIAL

## 2024-08-16 DIAGNOSIS — R35.0 URINARY FREQUENCY: Primary | ICD-10-CM

## 2024-08-16 PROCEDURE — 97110 THERAPEUTIC EXERCISES: CPT | Mod: GP | Performed by: PHYSICAL THERAPIST

## 2024-08-16 PROCEDURE — 97530 THERAPEUTIC ACTIVITIES: CPT | Mod: GP | Performed by: PHYSICAL THERAPIST

## 2024-08-26 ENCOUNTER — ANCILLARY PROCEDURE (OUTPATIENT)
Dept: ULTRASOUND IMAGING | Facility: CLINIC | Age: 89
End: 2024-08-26
Attending: FAMILY MEDICINE
Payer: COMMERCIAL

## 2024-08-26 DIAGNOSIS — E04.1 NODULAR THYROID DISEASE: ICD-10-CM

## 2024-08-26 DIAGNOSIS — E03.9 HYPOTHYROIDISM, UNSPECIFIED TYPE: ICD-10-CM

## 2024-08-26 PROCEDURE — 76536 US EXAM OF HEAD AND NECK: CPT | Mod: TC | Performed by: RADIOLOGY

## 2024-08-26 NOTE — RESULT ENCOUNTER NOTE
Per radiologist:  Nodule 1: Complex cystic and solid nodule in the superior left thyroid  lobe measures 1 x 0.5 x 0.6 cm.   Composition: Mixed cystic and solid, 1 point   Echogenicity: Unable to determine, 1 point   Shape: Wider-than-tall, 0 points   Margin: Ill-defined, 0 points   Echogenic Foci: None, or large comet-tail artifacts, 0 points   Point Total: 1-2 points. TI-RADS 2. No FNA.

## 2024-08-27 NOTE — PROGRESS NOTES
"ASSESSMENT & PLAN    Sena was seen today for pain and pain.    Diagnoses and all orders for this visit:    Primary osteoarthritis of both knees  -     (PRE-AUTH REQUEST) 48 mg hylan (SYNVISC ONE) injection 48 mg/6mL-ONCE    Chronic pain of both knees  -     XR Knee Bilateral 3 Views; Future      Chronic issue with aggravation.  Reviewed options for OA. Interested in trial of visco, has had steroid in past.  See below.  Questions answered. Discussed signs and symptoms that may indicate more serious issues; the patient was instructed to seek appropriate care if noted. Sena indicates understanding of these issues and agrees with the plan.      See Patient Instructions  Patient Instructions   Reviewed bilateral knee arthritis.  Discussed nature of degenerative arthrosis (\"arthritis\") of the knee.  Symptom treatment generally includes over-the-counter medications, ice or heat, topical treatments, and rest if needed.  May use compression or bracing for comfort. Cane / walker for mobility is ok as well.  Discussed potential benefits of rehabilitation, to maintain or improve function at the knee. Continue with therapy exercises.  There are benefits of exercise and remaining active. Also, weight loss (if applicable) can reduce pressure at the knee.  Discussed injection therapy. This typically includes steroid (cortisone), vs viscosupplement (\"lubricating shot\"). With lack of benefit from prior injections (presumed steroid), placed prior authorization for viscosupplement injection next, which will anticipate having done by one of my colleagues using ultrasound guidance.  Also briefly discussed future consideration of referral to orthopedic surgery for further evaluation and discussion of additional treatment options.    Prior auth for visco next.  From there, plan to get injection, and monitor 3-4 weeks to begin.    If you have any further questions for your physician or physician s care team you can contact them thru " "Mark43 or by calling [phone].      Aayush Gallardo [facility]    -----  Chief Complaint   Patient presents with    Right Knee - Pain    Left Knee - Pain       SUBJECTIVE  Sena Flores is a/an 89 year old female who is seen as a self referral for evaluation of bilateral knee.     The patient is seen by themselves.    Onset: 5 years(s) ago. Reports insidious onset without acute precipitating event. Pain has been gradually worsening.  Location of Pain: bilateral knee, diffuse, L>R  Worsened by: walking, twisting/turning, rising from sitting  Better with: Tylenol, sitting, sleeping  Treatments tried: Tylenol, use of cane, 2 steroid injections, most recent 2023 (in [place] and [place], not helpful)  Associated symptoms: weakness of left leg, catching, painful clicking laterally in left knee    Orthopedic/Surgical history: NO  Social History/Occupation: Lives between [place] and [place]; enjoys exercising at [employer] 3 days per week    **  Above information per rooming staff.  Additional history:          REVIEW OF SYSTEMS:  Review of Systems    OBJECTIVE:  Ht 1.702 m (5' 7\")   Wt 87.5 kg (193 lb)   BMI 30.23 kg/m         Bilateral Knee exam    ROM: lacking terminal flexion, extension , with stiffness    Tender: joint lines         RADIOLOGY:  Final results and radiologist's interpretation, available in the [facility] health record.  Images were reviewed with the patient in the office today.  My personal interpretation of the performed imaging: advanced degenerative changes bilaterally, with lateral compartment bone on bone appearance.      Recent Results (from the past 24 hour(s))   XR Knee Bilateral 3 Views    Narrative    EXAM: XR KNEE BILATERAL 3 VIEWS  DATE/TIME: 8/28/2024 4:23 PM    INDICATION: Chronic pain of both knees; Chronic pain of both knees;  Chronic pain of both knees  COMPARISON: 10/7/2021      Impression    IMPRESSION:     Right: Tricompartmental " degenerative arthrosis with prominent marginal  osteophytes and chondrocalcinosis. Advanced narrowing of the lateral  compartment. Genu valgus. No acute fracture. Small joint effusion  and/or synovitis.    Left: Tricompartmental degenerative arthrosis with prominent marginal  osteophytes and chondrocalcinosis. Advanced narrowing of the lateral  compartment. Genu valgus. Small joint effusion and/or synovitis. No  acute fracture.       CHERELLE CROW DO         SYSTEM ID:  [id number]

## 2024-08-28 ENCOUNTER — ANCILLARY PROCEDURE (OUTPATIENT)
Dept: GENERAL RADIOLOGY | Facility: CLINIC | Age: 89
End: 2024-08-28
Attending: PEDIATRICS
Payer: COMMERCIAL

## 2024-08-28 ENCOUNTER — OFFICE VISIT (OUTPATIENT)
Dept: ORTHOPEDICS | Facility: CLINIC | Age: 89
End: 2024-08-28
Payer: COMMERCIAL

## 2024-08-28 VITALS — WEIGHT: 193 LBS | BODY MASS INDEX: 30.29 KG/M2 | HEIGHT: 67 IN

## 2024-08-28 DIAGNOSIS — G89.29 CHRONIC PAIN OF BOTH KNEES: ICD-10-CM

## 2024-08-28 DIAGNOSIS — M17.0 PRIMARY OSTEOARTHRITIS OF BOTH KNEES: Primary | ICD-10-CM

## 2024-08-28 DIAGNOSIS — M25.562 CHRONIC PAIN OF BOTH KNEES: ICD-10-CM

## 2024-08-28 DIAGNOSIS — M25.561 CHRONIC PAIN OF BOTH KNEES: ICD-10-CM

## 2024-08-28 PROCEDURE — 73562 X-RAY EXAM OF KNEE 3: CPT | Mod: TC | Performed by: RADIOLOGY

## 2024-08-28 PROCEDURE — 99204 OFFICE O/P NEW MOD 45 MIN: CPT | Performed by: PEDIATRICS

## 2024-08-28 NOTE — PATIENT INSTRUCTIONS
"Reviewed bilateral knee arthritis.  Discussed nature of degenerative arthrosis (\"arthritis\") of the knee.  Symptom treatment generally includes over-the-counter medications, ice or heat, topical treatments, and rest if needed.  May use compression or bracing for comfort. Cane / walker for mobility is ok as well.  Discussed potential benefits of rehabilitation, to maintain or improve function at the knee. Continue with therapy exercises.  There are benefits of exercise and remaining active. Also, weight loss (if applicable) can reduce pressure at the knee.  Discussed injection therapy. This typically includes steroid (cortisone), vs viscosupplement (\"lubricating shot\"). With lack of benefit from prior injections (presumed steroid), placed prior authorization for viscosupplement injection next, which will anticipate having done by one of my colleagues using ultrasound guidance.  Also briefly discussed future consideration of referral to orthopedic surgery for further evaluation and discussion of additional treatment options.    Prior auth for visco next.  From there, plan to get injection, and monitor 3-4 weeks to begin.    If you have any further questions for your physician or physician s care team you can contact them thru [employer] or by calling [phone].    "

## 2024-08-28 NOTE — LETTER
"8/28/2024      Sena Flores  [address]  [place] [address]      Dear Colleague,    Thank you for referring your patient, Sena Flores, to the [facility] [place]. Please see a copy of my visit note below.    ASSESSMENT & PLAN    Sena was seen today for pain and pain.    Diagnoses and all orders for this visit:    Primary osteoarthritis of both knees  -     (PRE-AUTH REQUEST) 48 mg hylan (SYNVISC ONE) injection 48 mg/6mL-ONCE    Chronic pain of both knees  -     XR Knee Bilateral 3 Views; Future      Chronic issue with aggravation.  Reviewed options for OA. Interested in trial of visco, has had steroid in past.  See below.  Questions answered. Discussed signs and symptoms that may indicate more serious issues; the patient was instructed to seek appropriate care if noted. Sena indicates understanding of these issues and agrees with the plan.      See Patient Instructions  Patient Instructions   Reviewed bilateral knee arthritis.  Discussed nature of degenerative arthrosis (\"arthritis\") of the knee.  Symptom treatment generally includes over-the-counter medications, ice or heat, topical treatments, and rest if needed.  May use compression or bracing for comfort. Cane / walker for mobility is ok as well.  Discussed potential benefits of rehabilitation, to maintain or improve function at the knee. Continue with therapy exercises.  There are benefits of exercise and remaining active. Also, weight loss (if applicable) can reduce pressure at the knee.  Discussed injection therapy. This typically includes steroid (cortisone), vs viscosupplement (\"lubricating shot\"). With lack of benefit from prior injections (presumed steroid), placed prior authorization for viscosupplement injection next, which will anticipate having done by one of my colleagues using ultrasound guidance.  Also briefly discussed future consideration of referral to orthopedic surgery for further evaluation and " "discussion of additional treatment options.    Prior auth for visco next.  From there, plan to get injection, and monitor 3-4 weeks to begin.    If you have any further questions for your physician or physician s care team you can contact them thru MyChart or by calling [phone].      Aayush Figueroa DO  [facility] [place]    -----  Chief Complaint   Patient presents with     Right Knee - Pain     Left Knee - Pain       SUBJECTIVE  Sena Flores is a/an 89 year old female who is seen as a self referral for evaluation of bilateral knee.     The patient is seen by themselves.    Onset: 5 years(s) ago. Reports insidious onset without acute precipitating event. Pain has been gradually worsening.  Location of Pain: bilateral knee, diffuse, L>R  Worsened by: walking, twisting/turning, rising from sitting  Better with: Tylenol, sitting, sleeping  Treatments tried: Tylenol, use of cane, 2 steroid injections, most recent 2023 (in [place] and [place], not helpful)  Associated symptoms: weakness of left leg, catching, painful clicking laterally in left knee    Orthopedic/Surgical history: NO  Social History/Occupation: Lives between [place] and [place]; enjoys exercising at [employer] 3 days per week    **  Above information per rooming staff.  Additional history:          REVIEW OF SYSTEMS:  Review of Systems    OBJECTIVE:  Ht 1.702 m (5' 7\")   Wt 87.5 kg (193 lb)   BMI 30.23 kg/m         Bilateral Knee exam    ROM: lacking terminal flexion, extension , with stiffness    Tender: joint lines         RADIOLOGY:  Final results and radiologist's interpretation, available in the [facility] health record.  Images were reviewed with the patient in the office today.  My personal interpretation of the performed imaging: advanced degenerative changes bilaterally, with lateral compartment bone on bone appearance.      Recent Results (from the past 24 hour(s))   XR Knee Bilateral 3 Views    " Narrative    EXAM: XR KNEE BILATERAL 3 VIEWS  DATE/TIME: 8/28/2024 4:23 PM    INDICATION: Chronic pain of both knees; Chronic pain of both knees;  Chronic pain of both knees  COMPARISON: 10/7/2021      Impression    IMPRESSION:     Right: Tricompartmental degenerative arthrosis with prominent marginal  osteophytes and chondrocalcinosis. Advanced narrowing of the lateral  compartment. Genu valgus. No acute fracture. Small joint effusion  and/or synovitis.    Left: Tricompartmental degenerative arthrosis with prominent marginal  osteophytes and chondrocalcinosis. Advanced narrowing of the lateral  compartment. Genu valgus. Small joint effusion and/or synovitis. No  acute fracture.       CHERELLE CROW DO         SYSTEM ID:  [id number]               Again, thank you for allowing me to participate in the care of your patient.        Sincerely,        Aayush Figueroa DO

## 2024-08-29 ENCOUNTER — LAB (OUTPATIENT)
Dept: LAB | Facility: CLINIC | Age: 89
End: 2024-08-29
Payer: COMMERCIAL

## 2024-08-29 DIAGNOSIS — E67.3 HYPERVITAMINOSIS D: ICD-10-CM

## 2024-08-29 DIAGNOSIS — R79.89 HIGH SERUM VITAMIN B12: ICD-10-CM

## 2024-08-29 PROCEDURE — 82306 VITAMIN D 25 HYDROXY: CPT

## 2024-08-29 PROCEDURE — 36415 COLL VENOUS BLD VENIPUNCTURE: CPT

## 2024-08-29 PROCEDURE — 82607 VITAMIN B-12: CPT

## 2024-08-30 LAB
VIT B12 SERPL-MCNC: >4000 PG/ML (ref 232–1245)
VIT D+METAB SERPL-MCNC: 46 NG/ML (ref 20–50)

## 2024-09-06 ENCOUNTER — THERAPY VISIT (OUTPATIENT)
Dept: PHYSICAL THERAPY | Facility: CLINIC | Age: 89
End: 2024-09-06
Payer: COMMERCIAL

## 2024-09-06 DIAGNOSIS — R35.0 URINARY FREQUENCY: Primary | ICD-10-CM

## 2024-09-06 PROCEDURE — 97110 THERAPEUTIC EXERCISES: CPT | Mod: GP | Performed by: PHYSICAL THERAPIST

## 2024-09-11 ENCOUNTER — VIRTUAL VISIT (OUTPATIENT)
Dept: UROLOGY | Facility: CLINIC | Age: 89
End: 2024-09-11
Payer: COMMERCIAL

## 2024-09-11 DIAGNOSIS — N32.81 OAB (OVERACTIVE BLADDER): Primary | ICD-10-CM

## 2024-09-11 PROCEDURE — 99213 OFFICE O/P EST LOW 20 MIN: CPT | Performed by: STUDENT IN AN ORGANIZED HEALTH CARE EDUCATION/TRAINING PROGRAM

## 2024-09-11 ASSESSMENT — PAIN SCALES - GENERAL: PAINLEVEL: NO PAIN (0)

## 2024-09-11 NOTE — PROGRESS NOTES
UROLOGY OUTPATIENT VISIT      Chief Complaint:   Urinary frequency follow-up      Synopsis   Sena Flores is a very pleasant AGE: 89 year old year old person hypothyroidism.  And urinary frequency    7/3/2024 She is referred to see me for possible cyst in her kidney and urinary frequency. No cyst on the kidney on MRI of liver. As far as her urinary frequency she reports voiding almost every hour during the daytime and may be every 20 minutes sometimes at night. She is having difficulty sleeping as a result of this. She denies any hematuria. She reports urgency and urge incontinence she denies any stress incontinence she reports going through about 3 pads per day. PVR was 78. Planned for pelvic floor PT and consideration of medications.     9/11/2024 Today. She states nothing has changed. The physical therapy is working on the knees and hip. There were times when she used to urinate 6-7 10 times a night. Now down to 2-3 times per night. She says she is better and working on the kegels. She found out if she turns one way on the bed, if she sleeps on other side its better. She reports last night she went to bed late and woke up at 4 am and slept solidly until 4 am and then went to bathroom. During the day she does leak, and has urge incontinence. She finds if she does a proper kegel but admits she doesn't always do it right.       Imaging  None      Medical Comorbidities      Past Medical History:   Diagnosis Date    Arthritis     Hypertension     Hypothyroidism, unspecified type 10/31/2019               Medications     Current Outpatient Medications   Medication Sig Dispense Refill    Acetaminophen (TYLENOL PO) Take by mouth every 6 hours as needed for mild pain or fever      amLODIPine (NORVASC) 2.5 MG tablet Take 1 tablet (2.5 mg) by mouth daily 90 tablet 3    hydrochlorothiazide (HYDRODIURIL) 12.5 MG tablet Take 1 tablet (12.5 mg) by mouth daily 90 tablet 3    ibuprofen (ADVIL/MOTRIN) 100 MG tablet Take  100 mg by mouth every 4 hours as needed      levothyroxine (SYNTHROID/LEVOTHROID) 100 MCG tablet Take 1 tablet (100 mcg) by mouth daily 90 tablet 3     No current facility-administered medications for this visit.            Assessment/Plan   89-year-old female with hypothyroidism and urinary frequency.    #1 overactive bladder  Some improvement it seems like with physical therapy although it is not clear at first she told me there is no change but then later in the conversation she told me that the nocturia has improved.  But it still bothersome to her.  She is not sure if she wants to be on a medication for but willing to try for a short course to see if it is worthwhile.  Will start her on vibegron daily.  I have only given her a short 30-day course and I will plan to check in with her in about a month to see how she is feeling to see if she wants to continue the medication further.    CC:  Neno Genao    Additional Coding Information:    15 min

## 2024-09-11 NOTE — PROGRESS NOTES
Sena is a 89 year old who is being evaluated via a billable telephone visit.    What phone number would you like to be contacted at? [phone]  How would you like to obtain your AVS?   Mail a copy  Originating Location (pt. Location): Home    Distant Location (provider location):  On-site        Phone call duration: 15 minutes  Signed Electronically by: Domenica Sandhu MD

## 2024-09-13 ENCOUNTER — THERAPY VISIT (OUTPATIENT)
Dept: PHYSICAL THERAPY | Facility: CLINIC | Age: 89
End: 2024-09-13
Payer: COMMERCIAL

## 2024-09-13 DIAGNOSIS — R35.0 URINARY FREQUENCY: Primary | ICD-10-CM

## 2024-09-13 PROCEDURE — 97530 THERAPEUTIC ACTIVITIES: CPT | Mod: GP | Performed by: PHYSICAL THERAPIST

## 2024-09-13 PROCEDURE — 97110 THERAPEUTIC EXERCISES: CPT | Mod: GP | Performed by: PHYSICAL THERAPIST

## 2024-09-13 PROCEDURE — 97140 MANUAL THERAPY 1/> REGIONS: CPT | Mod: GP | Performed by: PHYSICAL THERAPIST

## 2024-09-20 ENCOUNTER — THERAPY VISIT (OUTPATIENT)
Dept: PHYSICAL THERAPY | Facility: CLINIC | Age: 89
End: 2024-09-20
Payer: COMMERCIAL

## 2024-09-20 DIAGNOSIS — R35.0 URINARY FREQUENCY: Primary | ICD-10-CM

## 2024-09-20 PROCEDURE — 97530 THERAPEUTIC ACTIVITIES: CPT | Mod: GP | Performed by: PHYSICAL THERAPIST

## 2024-09-20 PROCEDURE — 97110 THERAPEUTIC EXERCISES: CPT | Mod: GP | Performed by: PHYSICAL THERAPIST

## 2024-09-29 DIAGNOSIS — I10 BENIGN ESSENTIAL HYPERTENSION: ICD-10-CM

## 2024-09-30 ENCOUNTER — TELEPHONE (OUTPATIENT)
Dept: URGENT CARE | Facility: URGENT CARE | Age: 89
End: 2024-09-30
Payer: COMMERCIAL

## 2024-09-30 RX ORDER — HYDROCHLOROTHIAZIDE 12.5 MG/1
12.5 TABLET ORAL DAILY
Qty: 30 TABLET | Refills: 11 | Status: SHIPPED | OUTPATIENT
Start: 2024-09-30

## 2024-09-30 RX ORDER — AMLODIPINE BESYLATE 2.5 MG/1
2.5 TABLET ORAL DAILY
Qty: 30 TABLET | Refills: 11 | Status: SHIPPED | OUTPATIENT
Start: 2024-09-30

## 2024-09-30 NOTE — TELEPHONE ENCOUNTER
LVM with detailed information regarding that patient does NOT need a  following his SynviscOne injections on 10/3/24.     Ferny Kenyon ATC

## 2024-09-30 NOTE — TELEPHONE ENCOUNTER
[facility] Call Center    Phone Message    May a detailed message be left on voicemail: yes     Reason for Call: Other: you may call or send pt a [facility] message. Patient calling as she needs to know if she needs a  after the procedure? If she needs a  she needs to know how long the appt is going to take?      Action Taken: Other: te    Travel Screening: Not Applicable     Date of Service:

## 2024-10-02 ENCOUNTER — TELEPHONE (OUTPATIENT)
Dept: FAMILY MEDICINE | Facility: CLINIC | Age: 89
End: 2024-10-02

## 2024-10-02 NOTE — TELEPHONE ENCOUNTER
Dr Genao  Pt is scheduled for bilateral knee injections tomorrow..  She is wondering if she should have labs drawn before that because she said she has a very elevated B 12 level and her potassium in the past has been low.    Also she takes a Ensure supplement daily and 6 tabs of Natures Balance vitamins daily.

## 2024-10-02 NOTE — TELEPHONE ENCOUNTER
Her B12 level is very high likely due to the multivitamin, and if the ensure has B12 as well.   She may stop the multivitamins.    For joint injections there is no requirement for blood testing for anything.  Her last potassium level was normal.

## 2024-10-03 ENCOUNTER — OFFICE VISIT (OUTPATIENT)
Dept: ORTHOPEDICS | Facility: CLINIC | Age: 89
End: 2024-10-03
Payer: COMMERCIAL

## 2024-10-03 DIAGNOSIS — M17.0 PRIMARY OSTEOARTHRITIS OF BOTH KNEES: Primary | ICD-10-CM

## 2024-10-03 PROCEDURE — 20611 DRAIN/INJ JOINT/BURSA W/US: CPT | Mod: 50 | Performed by: FAMILY MEDICINE

## 2024-10-03 NOTE — TELEPHONE ENCOUNTER
"Spoke with pt and gave her provider message. Pt states she has \"not been taking multivitamins all summer\" and proceeded to state she \"takes a brain pill, colace, and balance nature vitamins and minerals and nothing has B12 in them that she has noticed\" Pt is wondering if there is something that she is eating that could be causing this elevation. Recommendations? Please advise.    Sayra Huff on 10/3/2024 at 7:39 AM    "

## 2024-10-03 NOTE — PROGRESS NOTES
Large Joint Injection/Arthocentesis: bilateral knee    Date/Time: 10/3/2024 10:22 AM    Performed by: Kostas Guerrero MD  Authorized by: Kostas Guerrero MD    Indications:  Pain and osteoarthritis  Needle Size:  21 G  Guidance: ultrasound    Approach:  Superolateral  Location:  Knee  Laterality:  Bilateral      Medications (Right):  48 mg hylan 48 MG/6ML  Aspirate amount (mL):  60  Aspirate:  Serous and yellow  Medications (Left):  48 mg hylan 48 MG/6ML  Aspirate amount (mL):  40  Aspirate:  Serous and yellow  Outcome:  Tolerated well, no immediate complications  Procedure discussed: discussed risks, benefits, and alternatives    Consent Given by:  Patient  Timeout: timeout called immediately prior to procedure    Prep: patient was prepped and draped in usual sterile fashion     Ultrasound images of procedure were permanently stored.   Referred by Dr. Figueroa     Patient tolerated bilateral knee joint aspirations/hyaluronic acid injections today.  Aftercare instructions given to patient.  Plan to follow-up as previously discussed with referring provider.  Ultrasound guided images were permanently stored.     Kostas Guerrero MD [facility]

## 2024-10-03 NOTE — LETTER
10/3/2024      Sena Flores  [address]  [place] [address]      Dear Colleague,    Thank you for referring your patient, Sena Flores, to the [facility] [place]. Please see a copy of my visit note below.    Large Joint Injection/Arthocentesis: bilateral knee    Date/Time: 10/3/2024 10:22 AM    Performed by: Kostas Guerrero MD  Authorized by: Kostas Guerrero MD    Indications:  Pain and osteoarthritis  Needle Size:  21 G  Guidance: ultrasound    Approach:  Superolateral  Location:  Knee  Laterality:  Bilateral      Medications (Right):  48 mg hylan 48 MG/6ML  Aspirate amount (mL):  60  Aspirate:  Serous and yellow  Medications (Left):  48 mg hylan 48 MG/6ML  Aspirate amount (mL):  40  Aspirate:  Serous and yellow  Outcome:  Tolerated well, no immediate complications  Procedure discussed: discussed risks, benefits, and alternatives    Consent Given by:  Patient  Timeout: timeout called immediately prior to procedure    Prep: patient was prepped and draped in usual sterile fashion     Ultrasound images of procedure were permanently stored.   Referred by Dr. Figueroa     Patient tolerated bilateral knee joint aspirations/hyaluronic acid injections today.  Aftercare instructions given to patient.  Plan to follow-up as previously discussed with referring provider.  Ultrasound guided images were permanently stored.     Kostas Guerrero MD [facility]            Again, thank you for allowing me to participate in the care of your patient.        Sincerely,        Kostas Guerrero MD

## 2024-10-03 NOTE — TELEPHONE ENCOUNTER
Pt in good understanding pt will be here next Wed she will be bringing in her vitamins to go over with PCP.    .Arcelia Cabello PSC

## 2024-10-03 NOTE — PATIENT INSTRUCTIONS
[facility] Injection Discharge Instructions    Procedure: Bilateral knee hyaluronic acid injection with aspiration    You may shower, however avoid swimming, tub baths or hot tubs for 24 hours following your procedure  You may have a mild to moderate increase in pain for several days following the injection.  It may take up to 30 days for the medication to start working although you may feel the effect as early as a few days after the procedure.  You may use ice packs for 10-15 minutes, 3 to 4 times a day at the injection site for comfort  You may use anti-inflammatory medications (such as Ibuprofen or Aleve or Advil) or Tylenol for pain control if necessary    If you experience any of the following, call [facility] @ [phone] or [phone]  -Fever over 100 degree F  -Swelling, bleeding, redness, drainage, warmth at the injection site  - New or worsening pain     It was great seeing you today!    Kostsa Guerrero

## 2024-10-03 NOTE — TELEPHONE ENCOUNTER
"\"Nature's Balance vitamins\" sounds like it is a multivitamins and usually these contain B12.  Other supplements like the \"brain pill\" may contain that too.   Colace is not known to contain the vitamin nor known to affect B12 levels.  I recommend stopping the supplements she is taking.  She may take colace as needed for constipation.  If she has other question or concern, schedule a follow up.  "

## 2024-10-09 ENCOUNTER — ORDERS ONLY (AUTO-RELEASED) (OUTPATIENT)
Dept: FAMILY MEDICINE | Facility: CLINIC | Age: 89
End: 2024-10-09
Payer: COMMERCIAL

## 2024-10-09 ENCOUNTER — OFFICE VISIT (OUTPATIENT)
Dept: FAMILY MEDICINE | Facility: CLINIC | Age: 89
End: 2024-10-09
Attending: FAMILY MEDICINE
Payer: COMMERCIAL

## 2024-10-09 ENCOUNTER — VIRTUAL VISIT (OUTPATIENT)
Dept: UROLOGY | Facility: CLINIC | Age: 89
End: 2024-10-09
Payer: COMMERCIAL

## 2024-10-09 VITALS
OXYGEN SATURATION: 94 % | HEIGHT: 67 IN | WEIGHT: 191 LBS | RESPIRATION RATE: 16 BRPM | TEMPERATURE: 99.1 F | DIASTOLIC BLOOD PRESSURE: 78 MMHG | SYSTOLIC BLOOD PRESSURE: 134 MMHG | BODY MASS INDEX: 29.98 KG/M2 | HEART RATE: 72 BPM

## 2024-10-09 DIAGNOSIS — R42 DIZZINESS: Primary | ICD-10-CM

## 2024-10-09 DIAGNOSIS — R09.82 POSTNASAL DRIP: ICD-10-CM

## 2024-10-09 DIAGNOSIS — N32.81 OAB (OVERACTIVE BLADDER): Primary | ICD-10-CM

## 2024-10-09 DIAGNOSIS — R20.2 PARESTHESIA OF BOTH LOWER EXTREMITIES: ICD-10-CM

## 2024-10-09 DIAGNOSIS — R42 DIZZINESS: ICD-10-CM

## 2024-10-09 PROCEDURE — 99214 OFFICE O/P EST MOD 30 MIN: CPT | Performed by: FAMILY MEDICINE

## 2024-10-09 PROCEDURE — 99212 OFFICE O/P EST SF 10 MIN: CPT | Performed by: STUDENT IN AN ORGANIZED HEALTH CARE EDUCATION/TRAINING PROGRAM

## 2024-10-09 PROCEDURE — 93000 ELECTROCARDIOGRAM COMPLETE: CPT | Performed by: FAMILY MEDICINE

## 2024-10-09 RX ORDER — FLUTICASONE PROPIONATE 50 MCG
1 SPRAY, SUSPENSION (ML) NASAL DAILY
Qty: 16 G | Refills: 11 | Status: SHIPPED | OUTPATIENT
Start: 2024-10-09

## 2024-10-09 SDOH — HEALTH STABILITY: PHYSICAL HEALTH: ON AVERAGE, HOW MANY MINUTES DO YOU ENGAGE IN EXERCISE AT THIS LEVEL?: 0 MIN

## 2024-10-09 SDOH — HEALTH STABILITY: PHYSICAL HEALTH: ON AVERAGE, HOW MANY DAYS PER WEEK DO YOU ENGAGE IN MODERATE TO STRENUOUS EXERCISE (LIKE A BRISK WALK)?: 0 DAYS

## 2024-10-09 ASSESSMENT — SOCIAL DETERMINANTS OF HEALTH (SDOH): HOW OFTEN DO YOU GET TOGETHER WITH FRIENDS OR RELATIVES?: TWICE A WEEK

## 2024-10-09 ASSESSMENT — PAIN SCALES - GENERAL: PAINLEVEL: NO PAIN (1)

## 2024-10-09 NOTE — PROGRESS NOTES
Assessment & Plan     Dizziness  Patient was quite vague about her description but from what she mentioned, the symptom was more consistent with lightheadedness.  Reviewed her recent labs. No abnormal results to attribute symptom to.  Discussed additional work up. Patient agreed to the testing below. DDx: carotid stenosis,  paroxysmal dysrhythmia, vertigo, less suspect for osrthostatic hypotension.  EKG unchanged compared to previous. Wait for zio patch for further eval.  Advised fall precautions in detail.  Reasons to go to ER discussed in detail with patient.   - EKG 12-lead complete w/read - Clinics  - ZIO PATCH MAIL OUT  - US Carotid Bilateral    Paresthesia of both lower extremities  Significant hypoesthesia not reproduced on exam. Since bilateral, recommended EMG first to determine if peripheral neuropathy or if related to her vertebral compression history.  Patient agreed with plan.  She has high B12 so deficiency not a concern.  - EMG    Postnasal drip  Patient was advised on causes, expected course, symptoms and treatment of condition.  Discussed use of Fluticasone nasal spray and expected response.  Push oral fluids,.  Return precautions discussed and given to patient.   - fluticasone (FLONASE) 50 MCG/ACT nasal spray  Dispense: 16 g; Refill: 11      Patient has been advised of split billing requirements and indicates understanding: Yes              Patient Instructions   To schedule the carotid ultrasound, call [phone].     Referral for an electromyogram (EMG) has been signed. Schedulers will call you in the next 3-5 business days.     Zio patch monitor will be mailed to you with instructions on how to activate it, and how to turn it in after the monitoring period     When you change positions or move around, do it slowly and watch for dizziness or lightheadedness before you walk to prevent falls.    It is recommended that you discontinue all over the counter supplements.  Take only the medications  "prescribed to you.    Start Flonase nasal spray 1 spray to each nostril daily until we have continuous hard freeze.   You may slowly discontinue this if and when your runny nose and phlegm resolves.  See a provider if with worsening postnasal drip or cough.  You may use flonase if you have seasonal allergies. This works if you use it everyday during the allergy season.    Reconsider completing your immunizations to protect yourself from severe illness, and help prevent spread of infectious diseases.  You are due for: yearly flu shot, updated covid19 vaccine, RSV vaccine and pneumonia vaccine.  You may get the Shingrix vaccine for shingles if you desire, and after you verify with insurance how they cover the vaccine.        Raheel Shetty is a 89 year old, presenting for the following health issues:  Physical        10/9/2024     2:46 PM   Additional Questions   Roomed by Yesenia LUGO   Accompanied by self     HPI       Dizziness  Onset/Duration: a year  Description:   Do you feel faint: No  Does it feel like the surroundings (bed, room) are moving: No  Unsteady/off balance: No  Have you passed out or fallen: YES- in May 2024  Intensity: moderate  Progression of Symptoms: worsening and waxing and waning  Accompanying Signs & Symptoms:  Heart palpitations or chest pain: No  Nausea, vomiting: No  Weakness or lack of coordination in arms or legs: No  Vision or speech changes: YES- left eye more blurred in last few months  Numbness or tingling: YES- but patient has difficulty describing \"numbness\"; calves feel numb  Ringing in ears (Tinnitus): Yes  Hearing Loss: No  History:   Head trauma/concussion history: No  Previous similar symptoms: No  Recent bleeding history: No  Any new medications (BP?): No  Precipitating factors:   Worse with activity: No  Worse with head movement: YES  Alleviating factors:   Does staying in a fixed position give relief: YES  Therapies tried and outcome: None    Concern - phlegm in " "throat  Onset: worse months ago but has had it mildly throughout years  Description: need to clear throat; runny nose especially at night  Intensity: mild, moderate  Progression of Symptoms:  constant  Accompanying Signs & Symptoms: none  Previous history of similar problem: none  Precipitating factors:        Worsened by: none  Alleviating factors:        Improved by: gargling with listerine helps some  Therapies tried and outcome: None      Review of Systems  Constitutional, HEENT, cardiovascular, pulmonary, GI, , musculoskeletal, neuro, skin, endocrine and psych systems are negative, except as otherwise noted.      Objective    /78 (BP Location: Right arm, Patient Position: Chair, Cuff Size: Adult Large)   Pulse 72   Temp 99.1  F (37.3  C) (Tympanic)   Resp 16   Ht 1.689 m (5' 6.5\")   Wt 86.6 kg (191 lb)   SpO2 94%   BMI 30.37 kg/m    Body mass index is 30.37 kg/m .  Physical Exam   GENERAL: borderline obese, ambulatory w/o assist in general but prefers t o hold on to a wheelchair when walking out, alert and no distress  EYES: Eyes grossly normal to inspection, extraocular movements - intact, and PERRL  HENT: Ears - tympanic membrane intact and nonerythematous; Nose - pink swollen turbinates, small amt of clcear nasal mucus prsent, no sinus tenderness bilaterally; throat nonerythematous  NECK: nontender anterior cervical lymphadenopathy present.  RESP: lungs clear to auscultation - no rales, no rhonchi, no wheezes  CV: regular rates and rhythm, normal S1 S2, no S3 or S4 and no murmur  SKIN:no rashes   NEURO: Patient is A and O X 3; Cranial nerves 2-12 intact;  Strength 5/5 all extremities; DTR: ++ x 4; patient reported feeling \"dizzy\" when she turns head but resolves within seconds; Sensory grossly intact - no reproducible hypoesthesia; no tremors No problems with motor coordination     EKG today showed no difference compared to last one on record. Undetermined rhythm but there are p waves seen, no " PVCs.         Signed Electronically by: Neno Genao MD

## 2024-10-09 NOTE — PROGRESS NOTES
UROLOGY OUTPATIENT VISIT      Chief Complaint:   Urinary frequency      Synopsis   Sena Flores is a very pleasant AGE: 89 year old year old person hypothyroidism.  And urinary frequency    7/3/2024 She is referred to see me for possible cyst in her kidney and urinary frequency. No cyst on the kidney on MRI of liver. As far as her urinary frequency she reports voiding almost every hour during the daytime and may be every 20 minutes sometimes at night. She is having difficulty sleeping as a result of this. She denies any hematuria. She reports urgency and urge incontinence she denies any stress incontinence she reports going through about 3 pads per day. PVR was 78. Planned for pelvic floor PT and consideration of medications.      9/11/2024 Today. She states nothing has changed. The physical therapy is working on the knees and hip. There were times when she used to urinate 6-7 10 times a night. Now down to 2-3 times per night. She says she is better and working on the kegels. She found out if she turns one way on the bed, if she sleeps on other side its better. She reports last night she went to bed late and woke up at 4 am and slept solidly until 4 am and then went to bathroom. During the day she does leak, and has urge incontinence. She finds if she does a proper kegel but admits she doesn't always do it right.  I prescribed her vibegron to the pharmacy for 30-day trial    10/9/2024 -patient reports that she was not aware that any medication was given.  She has not taken any new medication.  On further questioning she does report that maybe she saw that the medication was too expensive and did not feel like her symptoms were bothersome enough to pay for that medication.  Certainly it is not clear what happened.  It seems like right now she is not bothered by her urinary symptoms.  She does say that she had a good experience with the physical therapist Sayra but she is not always able to remember to do  the Kegel exercises      Medical Comorbidities      Past Medical History:   Diagnosis Date    Arthritis     Hypertension     Hypothyroidism, unspecified type 10/31/2019               Medications     Current Outpatient Medications   Medication Sig Dispense Refill    Acetaminophen (TYLENOL PO) Take by mouth every 6 hours as needed for mild pain or fever      amLODIPine (NORVASC) 2.5 MG tablet TAKE 1 TABLET BY MOUTH EVERY DAY 30 tablet 11    fluticasone (FLONASE) 50 MCG/ACT nasal spray Spray 1 spray into both nostrils daily. 16 g 11    hydroCHLOROthiazide 12.5 MG tablet TAKE 1 TABLET BY MOUTH EVERY DAY 30 tablet 11    ibuprofen (ADVIL/MOTRIN) 100 MG tablet Take 100 mg by mouth every 4 hours as needed      levothyroxine (SYNTHROID/LEVOTHROID) 100 MCG tablet Take 1 tablet (100 mcg) by mouth daily 90 tablet 3    Multiple Vitamin (MULTIVITAMIN ADULT PO) Take 1 capsule by mouth daily. (Patient not taking: Reported on 10/9/2024)      vibegron (GEMTESA) 75 MG TABS tablet Take 1 tablet (75 mg) by mouth daily. (Patient not taking: Reported on 10/9/2024) 30 tablet 0     Current Facility-Administered Medications   Medication Dose Route Frequency Provider Last Rate Last Admin    hylan (SYNVISC ONE) injection 48 mg  48 mg      48 mg at 10/03/24 1022    hylan (SYNVISC ONE) injection 48 mg  48 mg      48 mg at 10/03/24 1022            Assessment/Plan   89-year-old female with hypothyroidism and urinary frequency    Urinary frequency  Is not clear if she has had any improvement with physical therapy.  I think she has but she is mixing to respond sometimes she says that it has been helpful but other times she says there is no change.  We did try to give her a prescription for Viagra on last visit but it is not clear if she never picked it up or if she thought it was too expensive.  She does not remember    I did confirm with her daughter who lives in [place] to get some supplemental history and I think there is some forgetfulness  that the patient experiences based on my discussion with the daughter.  Apparently when the patient is at home she is not too bothered by her urinary symptoms because she is able to access the bathroom every hour but when she is traveling or going to music concerts that is when it becomes a problem.    At this time we will get a follow-up as needed as the patient does not seem too bothered by this but I did encourage the daughter to reach out to me as she is coming to visit her later this month and if they want to try medication they have the list of different options and how much it costs and they can let me know if there is one that they prefer    CC:  Neno Genao  Telephone call  12:07 pm to 12:6 pm 9 min 18 seconds

## 2024-10-09 NOTE — PATIENT INSTRUCTIONS
To schedule the carotid ultrasound, call [phone].     Referral for an electromyogram (EMG) has been signed. Schedulers will call you in the next 3-5 business days.     Zio patch monitor will be mailed to you with instructions on how to activate it, and how to turn it in after the monitoring period     When you change positions or move around, do it slowly and watch for dizziness or lightheadedness before you walk to prevent falls.    It is recommended that you discontinue all over the counter supplements.  Take only the medications prescribed to you.    Start Flonase nasal spray 1 spray to each nostril daily until we have continuous hard freeze.   You may slowly discontinue this if and when your runny nose and phlegm resolves.  See a provider if with worsening postnasal drip or cough.  You may use flonase if you have seasonal allergies. This works if you use it everyday during the allergy season.    Reconsider completing your immunizations to protect yourself from severe illness, and help prevent spread of infectious diseases.  You are due for: yearly flu shot, updated covid19 vaccine, RSV vaccine and pneumonia vaccine.  You may get the Shingrix vaccine for shingles if you desire, and after you verify with insurance how they cover the vaccine.

## 2024-10-14 ENCOUNTER — ANCILLARY PROCEDURE (OUTPATIENT)
Dept: ULTRASOUND IMAGING | Facility: CLINIC | Age: 89
End: 2024-10-14
Attending: FAMILY MEDICINE
Payer: COMMERCIAL

## 2024-10-14 DIAGNOSIS — R42 DIZZINESS: ICD-10-CM

## 2024-10-14 PROCEDURE — 93880 EXTRACRANIAL BILAT STUDY: CPT | Mod: TC | Performed by: RADIOLOGY

## 2024-10-23 DIAGNOSIS — I10 BENIGN ESSENTIAL HYPERTENSION: ICD-10-CM

## 2024-10-28 ENCOUNTER — OFFICE VISIT (OUTPATIENT)
Dept: ORTHOPEDICS | Facility: CLINIC | Age: 89
End: 2024-10-28
Payer: COMMERCIAL

## 2024-10-28 ENCOUNTER — ANCILLARY PROCEDURE (OUTPATIENT)
Dept: GENERAL RADIOLOGY | Facility: CLINIC | Age: 89
End: 2024-10-28
Attending: PEDIATRICS
Payer: COMMERCIAL

## 2024-10-28 DIAGNOSIS — M17.0 PRIMARY OSTEOARTHRITIS OF BOTH KNEES: ICD-10-CM

## 2024-10-28 DIAGNOSIS — S89.91XA RIGHT KNEE INJURY: ICD-10-CM

## 2024-10-28 DIAGNOSIS — M25.562 CHRONIC PAIN OF BOTH KNEES: ICD-10-CM

## 2024-10-28 DIAGNOSIS — M22.40 CHONDROMALACIA OF PATELLA, UNSPECIFIED LATERALITY: ICD-10-CM

## 2024-10-28 DIAGNOSIS — M25.561 CHRONIC PAIN OF BOTH KNEES: ICD-10-CM

## 2024-10-28 DIAGNOSIS — G89.29 CHRONIC PAIN OF BOTH KNEES: ICD-10-CM

## 2024-10-28 DIAGNOSIS — S89.91XA INJURY OF RIGHT KNEE, INITIAL ENCOUNTER: Primary | ICD-10-CM

## 2024-10-28 PROCEDURE — 99213 OFFICE O/P EST LOW 20 MIN: CPT | Performed by: PEDIATRICS

## 2024-10-28 PROCEDURE — 73562 X-RAY EXAM OF KNEE 3: CPT | Mod: TC | Performed by: RADIOLOGY

## 2024-10-28 RX ORDER — AMLODIPINE BESYLATE 2.5 MG/1
2.5 TABLET ORAL DAILY
Qty: 90 TABLET | Refills: 3 | Status: SHIPPED | OUTPATIENT
Start: 2024-10-28

## 2024-10-28 NOTE — LETTER
10/28/2024      Sena Flores  [address]  [place] [address]      Dear Colleague,    Thank you for referring your patient, Sena Flores, to the [facility] [place]. Please see a copy of my visit note below.    ASSESSMENT & PLAN    Sena was seen today for pain.    Diagnoses and all orders for this visit:    Injury of right knee, initial encounter  -     XR Knee Standing AP [place] Bilat Lat Right; Future    Primary osteoarthritis of both knees    Chronic pain of both knees    Chondromalacia of patella, unspecified laterality      This issue is acute on chronic and Unchanged.        ICD-10-CM    1. Injury of right knee, initial encounter  S89.91XA XR Knee Standing AP [place] Bilat Lat Right      2. Primary osteoarthritis of both knees  M17.0       3. Chronic pain of both knees  M25.561     M25.562     G89.29       4. Chondromalacia of patella, unspecified laterality  M22.40         Patient Instructions   Likely flare of underlying arthritis.  Discussed nature of degenerative arthrosis of the knee. Discussed symptom treatment with over-the-counter medications, ice or heat, topical treatments, and rest if needed. Discussed use of sleeve or wrap for comfort. Discussed benefits of exercise and physical therapy. Discussed injection therapy. Also briefly discussed future consideration of referral to orthopedic surgery for further evaluation and discussion of arthroplasty.    Plan:  - Today's Plan of Care:  Discussed activity considerations and other supportive care including Ice/Heat, OTC and other topical medications as needed.    Home Exercise Program    -We also discussed other future treatment options:  Consideration of repeat injections  Referral to Physical Therapy  Referral to Orthopedic Surgery    Follow Up: as needed    Concerning signs and symptoms were reviewed and all questions were answered at this time.    Teodora Cummings MD [facility]  Sports Medicine Physician  [facility]  "[facility]    -----  Chief Complaint   Patient presents with     Right Knee - Pain       SUBJECTIVE  Sena Flores is a/an 89 year old female who is seen as a WALK IN patient for evaluation of right knee.  She states she did a slight turn out of the bathroom and her knee just cracked, sharp pain and it gave out. She did not fall. It clicked and popped 4 more times.  She is using her cane while ambulating now to help.  She is only having pain when it cracks, the pain subsides quickly.  - She was recently seen by Dr. Figueroa on 8/28/24, And Dr. Guerrero on 10/3/24.  She recevied a HA injection of JACK knees on 10/3/24.       Onset: 5 years(s) ago, chronically, worse recently. Reports insidious onset without acute precipitating event. Pain has been gradually worsening.  Location of Pain: RIGHT knee; medial joint line   Worsened by: walking, twisting/turning, rising from sitting, can be doing \"nothing\", standing   Better with: Tylenol, sitting, sleeping, nothing for this AM   Treatments tried: Tylenol, use of cane, 2 steroid injections, most recent 2023 (in [place] and [place], not helpful), previous imaging ( 8/28/24 xray)   HA injection on 10/3/24 of JACK knees IA with Dr. Guerrero that provided minimal relief   Associated symptoms: weakness of left leg, catching, painful clicking laterally in left knee     Orthopedic/Surgical history: YES - Date: She was recently seen by Dr. Figueroa on 8/28/24, And Dr. 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   General: healthy, alert and in no distress  Skin: no suspicious lesions or rash.  CV: distal perfusion intact   Resp: normal respiratory effort without conversational dyspnea   Psych: normal mood and affect  Gait: NORMAL  Neuro: Normal light sensory exam of lower extremity    Right Knee " exam    Inspection:      mild effusion right    Patella:      Crepitus noted in the patellofemoral joint right    Tender:      medial patellar border right       medial joint line right    Non Tender:      remainder of knee area right    Knee ROM:      Range of motion limited on the right in full flexion and extension    Hip ROM:     Full active and passive ROM bilateral    Strength:      5-/5 with knee extension right    Special Tests:     positive (+) Kaela right       neg (-) Lachmans right       neg (-) anterior drawer right       neg (-) posterior drawer right       neg (-) varus at 0 deg and 30 deg right       neg (-) valgus at 0 deg and 30 deg right    Gait:      normal    Neurovascular:      2+ peripheral pulses bilaterally and brisk capillary refill       sensation grossly intact       RADIOLOGY:  Final results and radiologist's interpretation, available in the [facility] health record.  Images were reviewed with the patient in the office today.  My personal interpretation of the performed imaging:    AP and sunrise bilateral and right lateral XR views of knees reviewed: no acute bony abnormality, severe lateral and PF joint degenerative changes, chondrocalcinosis  - will follow official read     Review of the result(s) of each unique test - XR             Again, thank you for allowing me to participate in the care of your patient.        Sincerely,        Teodora Cummings MD

## 2024-10-28 NOTE — PROGRESS NOTES
ASSESSMENT & PLAN    Sena was seen today for pain.    Diagnoses and all orders for this visit:    Injury of right knee, initial encounter  -     XR Knee Standing AP [place] Bilat Lat Right; Future    Primary osteoarthritis of both knees    Chronic pain of both knees    Chondromalacia of patella, unspecified laterality      This issue is acute on chronic and Unchanged.        ICD-10-CM    1. Injury of right knee, initial encounter  S89.91XA XR Knee Standing AP [place] Bilat Lat Right      2. Primary osteoarthritis of both knees  M17.0       3. Chronic pain of both knees  M25.561     M25.562     G89.29       4. Chondromalacia of patella, unspecified laterality  M22.40         Patient Instructions   Likely flare of underlying arthritis.  Discussed nature of degenerative arthrosis of the knee. Discussed symptom treatment with over-the-counter medications, ice or heat, topical treatments, and rest if needed. Discussed use of sleeve or wrap for comfort. Discussed benefits of exercise and physical therapy. Discussed injection therapy. Also briefly discussed future consideration of referral to orthopedic surgery for further evaluation and discussion of arthroplasty.    Plan:  - Today's Plan of Care:  Discussed activity considerations and other supportive care including Ice/Heat, OTC and other topical medications as needed.    Home Exercise Program    -We also discussed other future treatment options:  Consideration of repeat injections  Referral to Physical Therapy  Referral to Orthopedic Surgery    Follow Up: as needed    Concerning signs and symptoms were reviewed and all questions were answered at this time.    Teodora Cummings MD [facility]  Sports Medicine Physician  [facility]    -----  Chief Complaint   Patient presents with    Right Knee - Pain       SUBJECTIVE  Sena Flores is a/an 89 year old female who is seen as a WALK IN patient for evaluation of right knee.  She states she did a slight turn out  "of the bathroom and her knee just cracked, sharp pain and it gave out. She did not fall. It clicked and popped 4 more times.  She is using her cane while ambulating now to help.  She is only having pain when it cracks, the pain subsides quickly.  - She was recently seen by Dr. Figueroa on 8/28/24, And Dr. Guerrero on 10/3/24.  She recevied a HA injection of JACK knees on 10/3/24.       Onset: 5 years(s) ago, chronically, worse recently. Reports insidious onset without acute precipitating event. Pain has been gradually worsening.  Location of Pain: RIGHT knee; medial joint line   Worsened by: walking, twisting/turning, rising from sitting, can be doing \"nothing\", standing   Better with: Tylenol, sitting, sleeping, nothing for this AM   Treatments tried: Tylenol, use of cane, 2 steroid injections, most recent 2023 (in [place] and [place], not helpful), previous imaging ( 8/28/24 xray)   HA injection on 10/3/24 of JACK knees IA with Dr. Guerrero that provided minimal relief   Associated symptoms: weakness of left leg, catching, painful clicking laterally in left knee     Orthopedic/Surgical history: YES - Date: She was recently seen by Dr. Figueroa on 8/28/24, And Dr. 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   General: healthy, alert and in no distress  Skin: no suspicious lesions or rash.  CV: distal perfusion intact   Resp: normal respiratory effort without conversational dyspnea   Psych: normal mood and affect  Gait: NORMAL  Neuro: Normal light sensory exam of lower extremity    Right Knee exam    Inspection:      mild effusion right    Patella:      Crepitus noted in the patellofemoral joint right    Tender:      medial patellar border right       medial joint line right    Non Tender:      remainder of knee area right    Knee ROM:      Range of " motion limited on the right in full flexion and extension    Hip ROM:     Full active and passive ROM bilateral    Strength:      5-/5 with knee extension right    Special Tests:     positive (+) Kaela right       neg (-) Lachmans right       neg (-) anterior drawer right       neg (-) posterior drawer right       neg (-) varus at 0 deg and 30 deg right       neg (-) valgus at 0 deg and 30 deg right    Gait:      normal    Neurovascular:      2+ peripheral pulses bilaterally and brisk capillary refill       sensation grossly intact       RADIOLOGY:  Final results and radiologist's interpretation, available in the [facility] health record.  Images were reviewed with the patient in the office today.  My personal interpretation of the performed imaging:    AP and sunrise bilateral and right lateral XR views of knees reviewed: no acute bony abnormality, severe lateral and PF joint degenerative changes, chondrocalcinosis  - will follow official read     Review of the result(s) of each unique test - XR

## 2024-10-28 NOTE — PATIENT INSTRUCTIONS
Likely flare of underlying arthritis.  Discussed nature of degenerative arthrosis of the knee. Discussed symptom treatment with over-the-counter medications, ice or heat, topical treatments, and rest if needed. Discussed use of sleeve or wrap for comfort. Discussed benefits of exercise and physical therapy. Discussed injection therapy. Also briefly discussed future consideration of referral to orthopedic surgery for further evaluation and discussion of arthroplasty.    Plan:  - Today's Plan of Care:  Discussed activity considerations and other supportive care including Ice/Heat, OTC and other topical medications as needed.    Home Exercise Program    -We also discussed other future treatment options:  Consideration of repeat injections  Referral to Physical Therapy  Referral to Orthopedic Surgery    Follow Up: as needed    If you have any further questions for your physician or physician s care team you can call [phone].

## 2024-11-08 PROCEDURE — 93244 EXT ECG>48HR<7D REV&INTERPJ: CPT | Performed by: INTERNAL MEDICINE

## 2024-12-01 ENCOUNTER — HEALTH MAINTENANCE LETTER (OUTPATIENT)
Age: 89
End: 2024-12-01

## 2024-12-30 DIAGNOSIS — R09.82 POSTNASAL DRIP: ICD-10-CM

## 2024-12-31 RX ORDER — FLUTICASONE PROPIONATE 50 MCG
1 SPRAY, SUSPENSION (ML) NASAL DAILY
Qty: 48 ML | Refills: 4 | Status: SHIPPED | OUTPATIENT
Start: 2024-12-31

## 2025-03-06 ENCOUNTER — TELEPHONE (OUTPATIENT)
Dept: GASTROENTEROLOGY | Facility: CLINIC | Age: 89
End: 2025-03-06
Payer: COMMERCIAL

## 2025-03-06 NOTE — TELEPHONE ENCOUNTER
"Sent Mychart (1st Attempt) and Patient Contacted for the patient to call back and schedule the following:    Appointment type: New  Provider:   Return date: D   Specialty phone number: [phone]  Additional appointment(s) needed:   Additonal Notes:     3/6 Pt contacted and is unable to schedule due to [place] ([place] and is not sure when they will come back. Stated they will call back sometime in April. AA       Appt req per MILLIE Douglas (See below)   \"  Hi team     Can you please call patient to offer clinic visit?     New Patient Clinic Visit     Imaging required prior to clinic?no     MD/Provider:Dr. Felix       Virtual/In person:pt preference on scheduled day     Referring MD:loni     Reason for visit:pancreatic cysts     Link Referral:no     Thank you!     Stella \"   "

## 2025-05-23 DIAGNOSIS — E03.9 HYPOTHYROIDISM, UNSPECIFIED TYPE: ICD-10-CM

## 2025-05-27 RX ORDER — LEVOTHYROXINE SODIUM 100 UG/1
100 TABLET ORAL
Qty: 90 TABLET | Refills: 0 | Status: SHIPPED | OUTPATIENT
Start: 2025-05-27

## 2025-07-02 ENCOUNTER — TELEPHONE (OUTPATIENT)
Dept: FAMILY MEDICINE | Facility: CLINIC | Age: 89
End: 2025-07-02
Payer: COMMERCIAL

## 2025-07-02 NOTE — TELEPHONE ENCOUNTER
Patient Quality Outreach    Patient is due for the following:   Physical Annual Wellness Visit    Action(s) Taken:   Schedule a Annual Wellness Visit    Type of outreach:    Sent [employer] message.    Questions for provider review:    None         Asmita Blandon MA  Chart routed to None.